# Patient Record
Sex: FEMALE | Race: WHITE | NOT HISPANIC OR LATINO | Employment: FULL TIME | URBAN - METROPOLITAN AREA
[De-identification: names, ages, dates, MRNs, and addresses within clinical notes are randomized per-mention and may not be internally consistent; named-entity substitution may affect disease eponyms.]

---

## 2023-08-21 ENCOUNTER — OFFICE VISIT (OUTPATIENT)
Dept: OBSTETRICS AND GYNECOLOGY | Facility: CLINIC | Age: 28
End: 2023-08-21
Payer: COMMERCIAL

## 2023-08-21 VITALS
WEIGHT: 144.4 LBS | DIASTOLIC BLOOD PRESSURE: 74 MMHG | SYSTOLIC BLOOD PRESSURE: 115 MMHG | BODY MASS INDEX: 23.21 KG/M2 | HEIGHT: 66 IN

## 2023-08-21 DIAGNOSIS — Z01.419 ENCOUNTER FOR GYNECOLOGICAL EXAMINATION WITHOUT ABNORMAL FINDING: Primary | ICD-10-CM

## 2023-08-21 PROCEDURE — 99385 PREV VISIT NEW AGE 18-39: CPT | Performed by: OBSTETRICS & GYNECOLOGY

## 2023-08-21 RX ORDER — DEXTROAMPHETAMINE SACCHARATE, AMPHETAMINE ASPARTATE, DEXTROAMPHETAMINE SULFATE AND AMPHETAMINE SULFATE 5; 5; 5; 5 MG/1; MG/1; MG/1; MG/1
TABLET ORAL
COMMUNITY

## 2023-08-21 RX ORDER — AMPICILLIN TRIHYDRATE 250 MG
CAPSULE ORAL
COMMUNITY

## 2023-08-21 RX ORDER — ESCITALOPRAM OXALATE 10 MG/1
TABLET, FILM COATED ORAL
COMMUNITY

## 2023-08-21 NOTE — PROGRESS NOTES
GYN Annual Exam     CC- Here for annual exam.     Camille Alford is a 28 y.o. female who presents for annual well woman exam. Periods are regular every 28-30 days, lasting a few days. Dysmenorrhea:none. Cyclic symptoms include none. No intermenstrual bleeding, spotting, or discharge.    Patient seen today as a new patient.  We discussed her obstetric history that was notable for a term vaginal delivery with a third-degree laceration.  She does have some resultant pelvic floor relaxation symptoms and some occasional stress urinary incontinence with certain activities.  She has done some pelvic floor strengthening maneuvers on her own and these have helped.  She is a runner and works out regularly.  She does note that she is unable to do any significant weight training as she will feel too much pelvic floor pressure to continue.  She does not have any rectal incontinence problems.  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    OB History    No obstetric history on file.         Current contraception: none  History of abnormal Pap smear: no  Family history of uterine, colon or ovarian cancer: no  History of abnormal mammogram: no  Family history of breast cancer: no  Last Pap : 2022    History reviewed. No pertinent past medical history.    Past Surgical History:   Procedure Laterality Date    WISDOM TOOTH EXTRACTION           Current Outpatient Medications:     amphetamine-dextroamphetamine (ADDERALL) 20 MG tablet, , Disp: , Rfl:     Lexapro 10 MG tablet, , Disp: , Rfl:     Prenatal MV-Min-Fe Fum-FA-DHA (PRENATAL 1 PO), , Disp: , Rfl:     Red Yeast Rice 600 MG capsule, , Disp: , Rfl:     No Known Allergies    Social History     Tobacco Use    Smoking status: Never    Smokeless tobacco: Never   Vaping Use    Vaping Use: Never used   Substance Use Topics    Alcohol use: Yes     Comment: occ    Drug use: Never       History reviewed. No pertinent family history.    Review of Systems   Constitutional:  Negative  "for fatigue and fever.   Genitourinary:  Positive for pelvic pressure and urinary incontinence. Negative for menstrual problem.     /74   Ht 167.6 cm (66\")   Wt 65.5 kg (144 lb 6.4 oz)   LMP 2023 (Exact Date)   BMI 23.31 kg/mý     Physical Exam  Constitutional:       Appearance: She is normal weight.   Genitourinary:      Bladder and urethral meatus normal.      No lesions in the vagina.      Right Labia: No lesions.     Left Labia: No lesions.     No vaginal discharge, tenderness or bleeding.      Posterior vaginal prolapse present.     No vaginal atrophy present.       Right Adnexa: not tender, not full and no mass present.     Left Adnexa: not tender, not full and no mass present.     No cervical motion tenderness, discharge, friability or lesion.      Uterus is not enlarged, fixed or tender.      No uterine mass detected.     Uterus is midaxial.   Neck:      Thyroid: No thyroid mass or thyromegaly.   Abdominal:      General: Abdomen is flat.      Palpations: Abdomen is soft. There is no mass.      Tenderness: There is no abdominal tenderness.   Neurological:      Mental Status: She is alert.   Vitals reviewed.             Assessment     1) GYN annual well woman exam.   2) mild pelvic organ prolapse with rectocele noted on exam.  Lengthy discussion with the patient regarding the findings on the pelvic exam and correlating those with the symptoms that she feels on a regular basis and with Valsalva.  I think she is doing the right thing by continuing the pelvic floor exercises.  We discussed the ramifications of another pregnancy and vaginal delivery on top of what she is already dealt with and certainly could consider elective  section should she desire not to pursue another vaginal delivery and potentially worsen the pelvic floor symptoms that she does have.     Plan     1) Breast Health - Clinical breast exam yearly, Discussed American cancer society recommendations for breast cancer " screening, and Self breast awareness monthly  2) Pap -collected today  3) Smoking status-negative  4) Encouraged to be wary of information obtained via social media and internet based on source and search.  5) Follow up prn and one year.       Song Norwood MD   8/21/2023  12:57 EDT

## 2023-08-23 ENCOUNTER — PATIENT MESSAGE (OUTPATIENT)
Dept: OBSTETRICS AND GYNECOLOGY | Facility: CLINIC | Age: 28
End: 2023-08-23
Payer: COMMERCIAL

## 2023-08-23 ENCOUNTER — PATIENT ROUNDING (BHMG ONLY) (OUTPATIENT)
Dept: OBSTETRICS AND GYNECOLOGY | Facility: CLINIC | Age: 28
End: 2023-08-23
Payer: COMMERCIAL

## 2023-08-23 NOTE — PROGRESS NOTES
My chart message has been sent to the patient for PATIENT ROUNDING with [facility].

## 2023-08-24 LAB
CYTOLOGIST CVX/VAG CYTO: NORMAL
CYTOLOGY CVX/VAG DOC CYTO: NORMAL
CYTOLOGY CVX/VAG DOC THIN PREP: NORMAL
DX ICD CODE: NORMAL
HIV 1 & 2 AB SER-IMP: NORMAL
HPV GENOTYPE REFLEX: NORMAL
HPV I/H RISK 4 DNA CVX QL PROBE+SIG AMP: NEGATIVE
OTHER STN SPEC: NORMAL
STAT OF ADQ CVX/VAG CYTO-IMP: NORMAL

## 2023-11-27 ENCOUNTER — HOSPITAL ENCOUNTER (EMERGENCY)
Facility: HOSPITAL | Age: 28
Discharge: LEFT BEFORE DISCHARGE | End: 2023-11-27
Attending: EMERGENCY MEDICINE
Payer: COMMERCIAL

## 2023-11-27 VITALS
DIASTOLIC BLOOD PRESSURE: 83 MMHG | OXYGEN SATURATION: 100 % | SYSTOLIC BLOOD PRESSURE: 140 MMHG | BODY MASS INDEX: 23.18 KG/M2 | HEIGHT: 67 IN | TEMPERATURE: 97.6 F | WEIGHT: 147.71 LBS | HEART RATE: 106 BPM | RESPIRATION RATE: 18 BRPM

## 2023-11-27 PROCEDURE — 99211 OFF/OP EST MAY X REQ PHY/QHP: CPT | Performed by: EMERGENCY MEDICINE

## 2023-11-27 NOTE — ED NOTES
Pt states that after seeing her bp is wnl and not as high as her wrist bp automatic reader was at home she wants to leave and to follow up with primary care, pt left ambulatory in no obvious s/s of acute distress

## 2024-04-23 ENCOUNTER — INITIAL PRENATAL (OUTPATIENT)
Dept: OBSTETRICS AND GYNECOLOGY | Facility: CLINIC | Age: 29
End: 2024-04-23
Payer: COMMERCIAL

## 2024-04-23 VITALS — DIASTOLIC BLOOD PRESSURE: 78 MMHG | WEIGHT: 140.2 LBS | BODY MASS INDEX: 21.96 KG/M2 | SYSTOLIC BLOOD PRESSURE: 114 MMHG

## 2024-04-23 DIAGNOSIS — Z34.91 INITIAL OBSTETRIC VISIT IN FIRST TRIMESTER: ICD-10-CM

## 2024-04-23 DIAGNOSIS — Z32.01 POSITIVE PREGNANCY TEST: Primary | ICD-10-CM

## 2024-04-23 LAB
B-HCG UR QL: POSITIVE
EXPIRATION DATE: ABNORMAL
INTERNAL NEGATIVE CONTROL: NEGATIVE
INTERNAL POSITIVE CONTROL: POSITIVE
Lab: ABNORMAL

## 2024-04-23 PROCEDURE — 0501F PRENATAL FLOW SHEET: CPT | Performed by: OBSTETRICS & GYNECOLOGY

## 2024-04-23 PROCEDURE — 81025 URINE PREGNANCY TEST: CPT | Performed by: OBSTETRICS & GYNECOLOGY

## 2024-04-23 NOTE — PROGRESS NOTES
Initial ob visit     CC- Here to initiate prenatal care.    Camille Alford is being seen today for her first obstetrical visit.  She is a 29 y.o.    5w6d gestation.   Patient is taking a prenatal vitamin and has some early nausea.  She is very sure about her menses.  We discussed the issues related to her rectocele and to expect some pelvic descent type symptoms during this pregnancy.  She very much wishes to have a vaginal delivery with this pregnancy.    # 1 - Date: None, Sex: Female, Weight: 7 g (0.3 oz), GA: None, Type: Vaginal, Spontaneous, Apgar1: None, Apgar5: None, Living: Living, Birth Comments: None    # 2 - Date: None, Sex: None, Weight: None, GA: None, Type: Spontaneous , Apgar1: None, Apgar5: None, Living: None, Birth Comments: None    # 3 - Date: None, Sex: None, Weight: None, GA: None, Type: None, Apgar1: None, Apgar5: None, Living: None, Birth Comments: None      Current obstetric complaints : Nausea      Prior obstetric issues, potential pregnancy concerns: None  Family history of genetic issues (includes FOB): Current child has a chromosome 16 abnormality.  Both parents have been screened and are negative  Prior infections concerning in pregnancy (Rash, fever in last 2 weeks): Negative  Varicella Hx -vaccine  Prior testing for Cystic Fibrosis Carrier or Sickle Cell Trait-pending  Prepregnancy BMI - Body mass index is 21.96 kg/m².  Hx of HSV for patient or partner : Negative    History reviewed. No pertinent past medical history.    Past Surgical History:   Procedure Laterality Date    WISDOM TOOTH EXTRACTION           Current Outpatient Medications:     Prenatal MV-Min-Fe Fum-FA-DHA (PRENATAL 1 PO), , Disp: , Rfl:     No Known Allergies    Social History     Socioeconomic History    Marital status:    Tobacco Use    Smoking status: Never    Smokeless tobacco: Never   Vaping Use    Vaping status: Never Used   Substance and Sexual Activity    Alcohol use: Yes     Comment: occ     Drug use: Never    Sexual activity: Yes     Partners: Male     Birth control/protection: None       History reviewed. No pertinent family history.    Review of systems     Constitutional : Nausea, fatigue present   : Vaginal bleeding, cramping negative  Breast Tenderness : Mild  All other systems reviewed and are negative       Objective    /78   Wt 63.6 kg (140 lb 3.2 oz)   LMP 03/13/2024 (Approximate)   BMI 21.96 kg/m²       General Appearance:    Alert, cooperative, in no acute distress, habitus normal   Head:    Normocephalic, without obvious abnormality, atraumatic   Eyes:            Lids and lashes normal, conjunctivae and sclerae normal, no   icterus, no pallor, corneas clear   Ears:    Ears appear intact with no abnormalities noted       Neck:   no thyromegaly, no mass.   Back:                         Lungs:     Clear to auscultation,respirations regular, even and     unlabored    Heart:    Regular rhythm and normal rate, normal S1 and S2, no            murmur, no gallop, no rub, no click   Breast Exam:    No masses, No nipple discharge   Abdomen:     Normal bowel sounds, no masses, no organomegaly, soft        non-tender, non-distended, no guarding, no rebound                 tenderness   Genitalia:        Extremities:   Moves all extremities well, no edema, no cyanosis, no              redness   Pulses:   Pulses palpable and equal bilaterally   Skin:   No bleeding, bruising or rash   Lymph nodes:   No palpable adenopathy   Neurologic:   Sensation intact, A&O times 3      Assessment & Plan     Diagnoses and all orders for this visit:    1. Positive pregnancy test (Primary)  -     POC Pregnancy, Urine    2. Initial obstetric visit in first trimester        1) Pregnancy at 5w6d  2) will see her back in 2 weeks with dating ultrasound.  We will do all of her blood work with her NIPT at 10+ weeks.         Activity recommendation : 150 minutes/week of moderate intensity aerobic activity unless we limit  for bleeding, hypertension or other pregnancy complication   Patient is on Prenatal vitamins  Problem list reviewed and updated.  Reviewed routine prenatal care with the office to include but not limited to   Zika (travel restrictions/ok to use insect repellant), not to changing cat litter, food restrictions, avoidance of alcohol, tobacco and drugs and saunas/hot tubs.   All questions answered.     Song Norwood MD   4/23/2024  13:14 EDT

## 2024-04-24 LAB
C TRACH RRNA SPEC QL NAA+PROBE: NEGATIVE
N GONORRHOEA RRNA SPEC QL NAA+PROBE: NEGATIVE

## 2024-04-25 LAB
AMPHETAMINES UR QL SCN: NEGATIVE NG/ML
BACTERIA UR CULT: NO GROWTH
BACTERIA UR CULT: NORMAL
BARBITURATES UR QL SCN: NEGATIVE NG/ML
BENZODIAZ UR QL: NEGATIVE NG/ML
BZE UR QL: NEGATIVE NG/ML
CANNABINOIDS UR QL SCN: NEGATIVE NG/ML
METHADONE UR QL SCN: NEGATIVE NG/ML
OPIATES UR QL: NEGATIVE NG/ML
PCP UR QL SCN: NEGATIVE NG/ML
PROPOXYPH UR QL SCN: NEGATIVE NG/ML

## 2024-05-09 ENCOUNTER — ROUTINE PRENATAL (OUTPATIENT)
Dept: OBSTETRICS AND GYNECOLOGY | Facility: CLINIC | Age: 29
End: 2024-05-09
Payer: COMMERCIAL

## 2024-05-09 VITALS — DIASTOLIC BLOOD PRESSURE: 74 MMHG | SYSTOLIC BLOOD PRESSURE: 112 MMHG | BODY MASS INDEX: 21.65 KG/M2 | WEIGHT: 138.2 LBS

## 2024-05-09 DIAGNOSIS — Z34.81 PRENATAL CARE, SUBSEQUENT PREGNANCY IN FIRST TRIMESTER: ICD-10-CM

## 2024-05-09 DIAGNOSIS — Z3A.01 7 WEEKS GESTATION OF PREGNANCY: Primary | ICD-10-CM

## 2024-05-09 LAB
GLUCOSE UR STRIP-MCNC: NEGATIVE MG/DL
PROT UR STRIP-MCNC: NEGATIVE MG/DL

## 2024-05-20 ENCOUNTER — TELEPHONE (OUTPATIENT)
Dept: OBSTETRICS AND GYNECOLOGY | Facility: CLINIC | Age: 29
End: 2024-05-20
Payer: COMMERCIAL

## 2024-05-20 DIAGNOSIS — O41.8X10 SUBCHORIONIC HEMATOMA IN FIRST TRIMESTER, SINGLE OR UNSPECIFIED FETUS: Primary | ICD-10-CM

## 2024-05-20 DIAGNOSIS — O46.8X1 SUBCHORIONIC HEMATOMA IN FIRST TRIMESTER, SINGLE OR UNSPECIFIED FETUS: Primary | ICD-10-CM

## 2024-05-20 NOTE — TELEPHONE ENCOUNTER
Spoke with Phoebe and let her know that Dr Norwood is fine with you coming in this week before your camping trip. Scheduled US for Thursday at 9:40 am.

## 2024-05-29 ENCOUNTER — ROUTINE PRENATAL (OUTPATIENT)
Dept: OBSTETRICS AND GYNECOLOGY | Facility: CLINIC | Age: 29
End: 2024-05-29
Payer: COMMERCIAL

## 2024-05-29 VITALS — BODY MASS INDEX: 21.61 KG/M2 | WEIGHT: 138 LBS | SYSTOLIC BLOOD PRESSURE: 100 MMHG | DIASTOLIC BLOOD PRESSURE: 60 MMHG

## 2024-05-29 DIAGNOSIS — Z34.81 PRENATAL CARE, SUBSEQUENT PREGNANCY IN FIRST TRIMESTER: Primary | ICD-10-CM

## 2024-05-29 DIAGNOSIS — Z3A.10 10 WEEKS GESTATION OF PREGNANCY: ICD-10-CM

## 2024-05-29 LAB
GLUCOSE UR STRIP-MCNC: NEGATIVE MG/DL
PROT UR STRIP-MCNC: NEGATIVE MG/DL

## 2024-05-29 NOTE — PROGRESS NOTES
OB follow up     Camille Alford is a 29 y.o.  10w2d being seen today for her obstetrical visit.  Patient reports no complaints. Fetal movement:  Too early .  Follow-up ultrasound today shows single viable intrauterine pregnancy at 10 weeks 2 days with no evidence of subchorionic bleed site.    Her prenatal care is complicated by (and status): Nothing    Review of Systems  Cramping/contractions : Negative  Vaginal bleeding: Negative  Fetal movement too early    /60   Wt 62.6 kg (138 lb)   LMP 2024 (Approximate)   BMI 21.61 kg/m²     FHT: 140 BPM   Uterine Size: size equals dates       Assessment    Diagnoses and all orders for this visit:    1. Prenatal care, subsequent pregnancy in first trimester (Primary)  -     POC Urinalysis Dipstick    2. 10 weeks gestation of pregnancy  -     WupgpetE21 PLUS Core (chr21,18,13,sex) - Blood,  -     OB Panel With HIV  -     Inheritest Core Panel (CF97,SMA,FraX) - Blood,; Future        1) pregnancy at 10w2d         Plan    Reviewed this stage of pregnancy  Problem list updated   Follow up in 4 weeks.  Prenatal labs and NIPT done today.    Song Norwood MD   2024  10:54 EDT

## 2024-06-02 ENCOUNTER — HOSPITAL ENCOUNTER (EMERGENCY)
Facility: HOSPITAL | Age: 29
Discharge: HOME | End: 2024-06-02
Attending: EMERGENCY MEDICINE | Admitting: EMERGENCY MEDICINE
Payer: COMMERCIAL

## 2024-06-02 VITALS
WEIGHT: 138 LBS | BODY MASS INDEX: 21.66 KG/M2 | SYSTOLIC BLOOD PRESSURE: 112 MMHG | TEMPERATURE: 100.4 F | HEIGHT: 67 IN | OXYGEN SATURATION: 99 % | DIASTOLIC BLOOD PRESSURE: 66 MMHG | HEART RATE: 118 BPM | RESPIRATION RATE: 18 BRPM

## 2024-06-02 DIAGNOSIS — Z34.91 FIRST TRIMESTER PREGNANCY: ICD-10-CM

## 2024-06-02 DIAGNOSIS — R50.9 FEVER AND CHILLS: ICD-10-CM

## 2024-06-02 DIAGNOSIS — B34.8 RHINOVIRUS INFECTION: Primary | ICD-10-CM

## 2024-06-02 LAB
ALBUMIN SERPL-MCNC: 4.2 G/DL (ref 3.5–5.2)
ALBUMIN/GLOB SERPL: 1.5 G/DL
ALP SERPL-CCNC: 65 U/L (ref 39–117)
ALT SERPL W P-5'-P-CCNC: 11 U/L (ref 1–33)
ANION GAP SERPL CALCULATED.3IONS-SCNC: 12.8 MMOL/L (ref 5–15)
AST SERPL-CCNC: 12 U/L (ref 1–32)
B PARAPERT DNA SPEC QL NAA+PROBE: NOT DETECTED
B PERT DNA SPEC QL NAA+PROBE: NOT DETECTED
BASOPHILS # BLD AUTO: 0.02 10*3/MM3 (ref 0–0.2)
BASOPHILS NFR BLD AUTO: 0.2 % (ref 0–1.5)
BILIRUB SERPL-MCNC: <0.2 MG/DL (ref 0–1.2)
BILIRUB UR QL STRIP: NEGATIVE
BUN SERPL-MCNC: 4 MG/DL (ref 6–20)
BUN/CREAT SERPL: 6.5 (ref 7–25)
C PNEUM DNA NPH QL NAA+NON-PROBE: NOT DETECTED
CALCIUM SPEC-SCNC: 9.3 MG/DL (ref 8.6–10.5)
CHLORIDE SERPL-SCNC: 100 MMOL/L (ref 98–107)
CLARITY UR: CLEAR
CO2 SERPL-SCNC: 22.2 MMOL/L (ref 22–29)
COLOR UR: YELLOW
CREAT SERPL-MCNC: 0.62 MG/DL (ref 0.57–1)
DEPRECATED RDW RBC AUTO: 39.3 FL (ref 37–54)
EGFRCR SERPLBLD CKD-EPI 2021: 123 ML/MIN/1.73
EOSINOPHIL # BLD AUTO: 0 10*3/MM3 (ref 0–0.4)
EOSINOPHIL NFR BLD AUTO: 0 % (ref 0.3–6.2)
ERYTHROCYTE [DISTWIDTH] IN BLOOD BY AUTOMATED COUNT: 12.2 % (ref 12.3–15.4)
FLUAV SUBTYP SPEC NAA+PROBE: NOT DETECTED
FLUBV RNA ISLT QL NAA+PROBE: NOT DETECTED
GLOBULIN UR ELPH-MCNC: 2.8 GM/DL
GLUCOSE SERPL-MCNC: 107 MG/DL (ref 65–99)
GLUCOSE UR STRIP-MCNC: NEGATIVE MG/DL
HADV DNA SPEC NAA+PROBE: NOT DETECTED
HCOV 229E RNA SPEC QL NAA+PROBE: NOT DETECTED
HCOV HKU1 RNA SPEC QL NAA+PROBE: NOT DETECTED
HCOV NL63 RNA SPEC QL NAA+PROBE: NOT DETECTED
HCOV OC43 RNA SPEC QL NAA+PROBE: NOT DETECTED
HCT VFR BLD AUTO: 38.5 % (ref 34–46.6)
HETEROPH AB SER QL LA: NEGATIVE
HGB BLD-MCNC: 13 G/DL (ref 12–15.9)
HGB UR QL STRIP.AUTO: NEGATIVE
HMPV RNA NPH QL NAA+NON-PROBE: NOT DETECTED
HPIV1 RNA ISLT QL NAA+PROBE: NOT DETECTED
HPIV2 RNA SPEC QL NAA+PROBE: NOT DETECTED
HPIV3 RNA NPH QL NAA+PROBE: NOT DETECTED
HPIV4 P GENE NPH QL NAA+PROBE: NOT DETECTED
IMM GRANULOCYTES # BLD AUTO: 0.04 10*3/MM3 (ref 0–0.05)
IMM GRANULOCYTES NFR BLD AUTO: 0.3 % (ref 0–0.5)
KETONES UR QL STRIP: NEGATIVE
LEUKOCYTE ESTERASE UR QL STRIP.AUTO: NEGATIVE
LYMPHOCYTES # BLD AUTO: 0.81 10*3/MM3 (ref 0.7–3.1)
LYMPHOCYTES NFR BLD AUTO: 7 % (ref 19.6–45.3)
M PNEUMO IGG SER IA-ACNC: NOT DETECTED
MCH RBC QN AUTO: 29.5 PG (ref 26.6–33)
MCHC RBC AUTO-ENTMCNC: 33.8 G/DL (ref 31.5–35.7)
MCV RBC AUTO: 87.5 FL (ref 79–97)
MONOCYTES # BLD AUTO: 0.53 10*3/MM3 (ref 0.1–0.9)
MONOCYTES NFR BLD AUTO: 4.6 % (ref 5–12)
NEUTROPHILS NFR BLD AUTO: 10.14 10*3/MM3 (ref 1.7–7)
NEUTROPHILS NFR BLD AUTO: 87.9 % (ref 42.7–76)
NITRITE UR QL STRIP: NEGATIVE
NRBC BLD AUTO-RTO: 0 /100 WBC (ref 0–0.2)
PH UR STRIP.AUTO: 7 [PH] (ref 5–8)
PLATELET # BLD AUTO: 265 10*3/MM3 (ref 140–450)
PMV BLD AUTO: 10 FL (ref 6–12)
POTASSIUM SERPL-SCNC: 4 MMOL/L (ref 3.5–5.2)
PROT SERPL-MCNC: 7 G/DL (ref 6–8.5)
PROT UR QL STRIP: NEGATIVE
RBC # BLD AUTO: 4.4 10*6/MM3 (ref 3.77–5.28)
RHINOVIRUS RNA SPEC NAA+PROBE: DETECTED
RSV RNA NPH QL NAA+NON-PROBE: NOT DETECTED
S PYO AG THROAT QL: NEGATIVE
SARS-COV-2 RNA NPH QL NAA+NON-PROBE: NOT DETECTED
SODIUM SERPL-SCNC: 135 MMOL/L (ref 136–145)
SP GR UR STRIP: <=1.005 (ref 1–1.03)
UROBILINOGEN UR QL STRIP: NORMAL
WBC NRBC COR # BLD AUTO: 11.54 10*3/MM3 (ref 3.4–10.8)

## 2024-06-02 PROCEDURE — 0202U NFCT DS 22 TRGT SARS-COV-2: CPT | Performed by: EMERGENCY MEDICINE

## 2024-06-02 PROCEDURE — 87081 CULTURE SCREEN ONLY: CPT | Performed by: EMERGENCY MEDICINE

## 2024-06-02 PROCEDURE — 86308 HETEROPHILE ANTIBODY SCREEN: CPT | Performed by: EMERGENCY MEDICINE

## 2024-06-02 PROCEDURE — 99283 EMERGENCY DEPT VISIT LOW MDM: CPT

## 2024-06-02 PROCEDURE — 81003 URINALYSIS AUTO W/O SCOPE: CPT | Performed by: EMERGENCY MEDICINE

## 2024-06-02 PROCEDURE — 87880 STREP A ASSAY W/OPTIC: CPT | Performed by: EMERGENCY MEDICINE

## 2024-06-02 PROCEDURE — 85025 COMPLETE CBC W/AUTO DIFF WBC: CPT | Performed by: EMERGENCY MEDICINE

## 2024-06-02 PROCEDURE — 25810000003 SODIUM CHLORIDE 0.9 % SOLUTION: Performed by: EMERGENCY MEDICINE

## 2024-06-02 PROCEDURE — 80053 COMPREHEN METABOLIC PANEL: CPT | Performed by: EMERGENCY MEDICINE

## 2024-06-02 RX ORDER — ACETAMINOPHEN 500 MG
1000 TABLET ORAL ONCE
Status: COMPLETED | OUTPATIENT
Start: 2024-06-02 | End: 2024-06-02

## 2024-06-02 RX ORDER — MAGNESIUM CARBONATE
POWDER (GRAM) MISCELLANEOUS
COMMUNITY

## 2024-06-02 RX ORDER — SODIUM CHLORIDE 0.9 % (FLUSH) 0.9 %
10 SYRINGE (ML) INJECTION AS NEEDED
Status: DISCONTINUED | OUTPATIENT
Start: 2024-06-02 | End: 2024-06-02 | Stop reason: HOSPADM

## 2024-06-02 RX ADMIN — ACETAMINOPHEN 1000 MG: 500 TABLET ORAL at 13:30

## 2024-06-02 RX ADMIN — SODIUM CHLORIDE 1000 ML: 9 INJECTION, SOLUTION INTRAVENOUS at 11:41

## 2024-06-02 NOTE — DISCHARGE INSTRUCTIONS
Rest at home avoiding any particularly strenuous activity today and tomorrow.     Eat small, frequent meals and drink plenty of fluids. You can safely take tylenol 1000mg every 6 hours as needed for fever, headache, bodyaches, and chills.    Monitor for any signs of recurrent symptoms or worsening and see your primary doctor to discuss your ER visit while returning to the ER if any concerns as we discussed.

## 2024-06-02 NOTE — Clinical Note
[facility] EMERGENCY DEPARTMENT  [address]  [facility] [address]  Phone: [phone]    Camille Alford was seen and treated in our emergency department on 6/2/2024.  She may return to work on 06/04/2024.         Thank you for choosing [facility].    Lou Cintron MD

## 2024-06-02 NOTE — ED PROVIDER NOTES
EMERGENCY DEPARTMENT ENCOUNTER    Room Number:  20/20  PCP: Angelia Jordan APRN  Independent Historians: Patient    HPI:  Chief Complaint: had concerns including Headache and Fever.      A complete HPI/ROS/PMH/PSH/SH/FH are unobtainable due to: None    Chronic or social conditions impacting patient care (Social Determinants of Health): None      Context: Camille Alford is a 29 y.o. female with no chronic medical problems but is actually 11 weeks pregnant currently with her third pregnancy who presents to the ED c/o acute febrile illness since Thursday.  Patient with Tmax at home of 102.  Her last dose of Tylenol was 8 AM this morning.  She started with sore throat and fever on Thursday, and she went to urgent care and had some swabs done that were all negative.  Patient says she now feels worse including diffuse bodyaches that does include her neck.  She also has headache.  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  She personally has not had pneumonia in the past and no recent antibiotics.  Patient reports no current issues with this pregnancy but did have early on some vaginal bleeding and spotting with a subchorionic hemorrhage that has since resolved.        Review of prior external notes (non-ED) -and- Review of prior external test results outside of this encounter: Patient sees Dr. Norwood for this pregnancy and was seen on May 29 in office.  I reviewed office notes.  Patient had prenatal labs drawn at that time.  Patient had strep and COVID swabs done at urgent care on May 31 that were negative    Prescription drug monitoring program review:     N/A    PAST MEDICAL HISTORY  Active Ambulatory Problems     Diagnosis Date Noted    No Active Ambulatory Problems     Resolved Ambulatory Problems     Diagnosis Date Noted    No Resolved Ambulatory Problems     No Additional Past Medical History         PAST SURGICAL HISTORY  Past Surgical History:    Procedure Laterality Date    WISDOM TOOTH EXTRACTION           FAMILY HISTORY  History reviewed. No pertinent family history.      SOCIAL HISTORY  Social History     Socioeconomic History    Marital status:    Tobacco Use    Smoking status: Never     Passive exposure: Never    Smokeless tobacco: Never   Vaping Use    Vaping status: Never Used   Substance and Sexual Activity    Alcohol use: Not Currently     Comment: occ    Drug use: Never    Sexual activity: Yes     Partners: Male     Birth control/protection: None         ALLERGIES  Patient has no known allergies.        REVIEW OF SYSTEMS  Review of Systems  Included in HPI  All systems reviewed and negative except for those discussed in HPI.      PHYSICAL EXAM    I have reviewed the triage vital signs and nursing notes.    ED Triage Vitals [06/02/24 1103]   Temp Heart Rate Resp BP SpO2   (!) 100.9 °F (38.3 °C) (!) 127 18 -- 99 %      Temp src Heart Rate Source Patient Position BP Location FiO2 (%)   Tympanic Monitor -- -- --       Physical Exam  GENERAL: Pleasant cooperative conversant well-appearing female, nontoxic appearance, alert, no acute distress  SKIN: Warm, dry  HENT: Normocephalic, atraumatic, posterior pharyngeal erythema with no exudates and no asymmetric swelling, no trismus, no uvula pointing  EYES: no scleral icterus, EOMI, no conjunctivitis  CV: regular rhythm, regular rate  RESPIRATORY: normal effort, lungs clear, no wheezing, no rhonchi  ABDOMEN: soft, nontender, nondistended  MUSCULOSKELETAL: no deformity, no lower extremity edema  NEURO: alert, moves all extremities, follows commands                                                                   LAB RESULTS  Recent Results (from the past 24 hour(s))   Comprehensive Metabolic Panel    Collection Time: 06/02/24 11:39 AM    Specimen: Arm, Right; Blood   Result Value Ref Range    Glucose 107 (H) 65 - 99 mg/dL    BUN 4 (L) 6 - 20 mg/dL    Creatinine 0.62 0.57 - 1.00 mg/dL    Sodium 135  (L) 136 - 145 mmol/L    Potassium 4.0 3.5 - 5.2 mmol/L    Chloride 100 98 - 107 mmol/L    CO2 22.2 22.0 - 29.0 mmol/L    Calcium 9.3 8.6 - 10.5 mg/dL    Total Protein 7.0 6.0 - 8.5 g/dL    Albumin 4.2 3.5 - 5.2 g/dL    ALT (SGPT) 11 1 - 33 U/L    AST (SGOT) 12 1 - 32 U/L    Alkaline Phosphatase 65 39 - 117 U/L    Total Bilirubin <0.2 0.0 - 1.2 mg/dL    Globulin 2.8 gm/dL    A/G Ratio 1.5 g/dL    BUN/Creatinine Ratio 6.5 (L) 7.0 - 25.0    Anion Gap 12.8 5.0 - 15.0 mmol/L    eGFR 123.0 >60.0 mL/min/1.73   Mononucleosis Screen    Collection Time: 06/02/24 11:39 AM    Specimen: Arm, Right; Blood   Result Value Ref Range    Monospot Negative Negative   CBC Auto Differential    Collection Time: 06/02/24 11:39 AM    Specimen: Arm, Right; Blood   Result Value Ref Range    WBC 11.54 (H) 3.40 - 10.80 10*3/mm3    RBC 4.40 3.77 - 5.28 10*6/mm3    Hemoglobin 13.0 12.0 - 15.9 g/dL    Hematocrit 38.5 34.0 - 46.6 %    MCV 87.5 79.0 - 97.0 fL    MCH 29.5 26.6 - 33.0 pg    MCHC 33.8 31.5 - 35.7 g/dL    RDW 12.2 (L) 12.3 - 15.4 %    RDW-SD 39.3 37.0 - 54.0 fl    MPV 10.0 6.0 - 12.0 fL    Platelets 265 140 - 450 10*3/mm3    Neutrophil % 87.9 (H) 42.7 - 76.0 %    Lymphocyte % 7.0 (L) 19.6 - 45.3 %    Monocyte % 4.6 (L) 5.0 - 12.0 %    Eosinophil % 0.0 (L) 0.3 - 6.2 %    Basophil % 0.2 0.0 - 1.5 %    Immature Grans % 0.3 0.0 - 0.5 %    Neutrophils, Absolute 10.14 (H) 1.70 - 7.00 10*3/mm3    Lymphocytes, Absolute 0.81 0.70 - 3.10 10*3/mm3    Monocytes, Absolute 0.53 0.10 - 0.90 10*3/mm3    Eosinophils, Absolute 0.00 0.00 - 0.40 10*3/mm3    Basophils, Absolute 0.02 0.00 - 0.20 10*3/mm3    Immature Grans, Absolute 0.04 0.00 - 0.05 10*3/mm3    nRBC 0.0 0.0 - 0.2 /100 WBC   Respiratory Panel PCR w/COVID-19(SARS-CoV-2) ALLISON/LISA/ANAID/PAD/COR/ANDREW In-House, NP Swab in [facility]/VTM, 2 HR TAT - Swab, Nasopharynx    Collection Time: 06/02/24 11:44 AM    Specimen: Nasopharynx; Swab   Result Value Ref Range    ADENOVIRUS, PCR Not Detected Not Detected     Coronavirus 229E Not Detected Not Detected    Coronavirus HKU1 Not Detected Not Detected    Coronavirus NL63 Not Detected Not Detected    Coronavirus OC43 Not Detected Not Detected    COVID19 Not Detected Not Detected - Ref. Range    Human Metapneumovirus Not Detected Not Detected    Human Rhinovirus/Enterovirus Detected (A) Not Detected    Influenza A PCR Not Detected Not Detected    Influenza B PCR Not Detected Not Detected    Parainfluenza Virus 1 Not Detected Not Detected    Parainfluenza Virus 2 Not Detected Not Detected    Parainfluenza Virus 3 Not Detected Not Detected    Parainfluenza Virus 4 Not Detected Not Detected    RSV, PCR Not Detected Not Detected    Bordetella pertussis pcr Not Detected Not Detected    Bordetella parapertussis PCR Not Detected Not Detected    Chlamydophila pneumoniae PCR Not Detected Not Detected    Mycoplasma pneumo by PCR Not Detected Not Detected   Rapid Strep A Screen - Swab, Throat    Collection Time: 06/02/24 11:48 AM    Specimen: Throat; Swab   Result Value Ref Range    Strep A Ag Negative Negative   Urinalysis With Microscopic If Indicated (No Culture) - Urine, Clean Catch    Collection Time: 06/02/24 12:32 PM    Specimen: Urine, Clean Catch   Result Value Ref Range    Color, UA Yellow Yellow, Straw    Appearance, UA Clear Clear    pH, UA 7.0 5.0 - 8.0    Specific Gravity, UA <=1.005 1.005 - 1.030    Glucose, UA Negative Negative    Ketones, UA Negative Negative    Bilirubin, UA Negative Negative    Blood, UA Negative Negative    Protein, UA Negative Negative    Leuk Esterase, UA Negative Negative    Nitrite, UA Negative Negative    Urobilinogen, UA 0.2 E.U./dL 0.2 - 1.0 E.U./dL         RADIOLOGY  No Radiology Exams Resulted Within Past 24 Hours      MEDICATIONS GIVEN IN ER  Medications   sodium chloride 0.9 % flush 10 mL (has no administration in time range)   acetaminophen (TYLENOL) tablet 1,000 mg (has no administration in time range)   sodium chloride 0.9 % bolus 1,000  mL (1,000 mL Intravenous New Bag 6/2/24 1141)         ORDERS PLACED DURING THIS VISIT:  Orders Placed This Encounter   Procedures    Rapid Strep A Screen - Swab, Throat    Respiratory Panel PCR w/COVID-19(SARS-CoV-2) ALLISON/LISA/ANAID/PAD/COR/ANDREW In-House, NP Swab in UTM/VTM, 2 HR TAT - Swab, Nasopharynx    Beta Strep Culture, Throat - Swab, Throat    Comprehensive Metabolic Panel    Urinalysis With Microscopic If Indicated (No Culture) - Urine, Clean Catch    Mononucleosis Screen    CBC Auto Differential    Insert Peripheral IV    CBC & Differential         OUTPATIENT MEDICATION MANAGEMENT:  Current Facility-Administered Medications Ordered in Epic   Medication Dose Route Frequency Provider Last Rate Last Admin    acetaminophen (TYLENOL) tablet 1,000 mg  1,000 mg Oral Once Lou Cintron MD        sodium chloride 0.9 % flush 10 mL  10 mL Intravenous PRN Lou Cintron MD         Current Outpatient Medications Ordered in Epic   Medication Sig Dispense Refill    Prenatal MV-Min-Fe Fum-FA-DHA (PRENATAL 1 PO)       Magnesium Carbonate powder Use.           PROCEDURES  Procedures            PROGRESS, DATA ANALYSIS, CONSULTS, AND MEDICAL DECISION MAKING  All labs have been independently interpreted by me.  All radiology studies have been reviewed by me. All EKG's have been independently viewed and interpreted by me.  Discussion below represents my analysis of pertinent findings related to patient's condition, differential diagnosis, treatment plan and final disposition.    Differential diagnosis includes but is not limited to   Patient presenting with sore throat, body aches, headache, and fever.  Treatment options are limited for patient's fever due to her being 11 weeks pregnant.  Plan for sepsis screening with respiratory panel, Monospot, strep screen, UA, and basic labs.          ED Course as of 06/02/24 1323   Sun Jun 02, 2024   1323 I discussed all results with patient and family and answered all questions  to the best of my ability. The patient was encouraged to follow up appropriately.      I specifically mention mildly elevated white blood cell count and rhinovirus positive on viral swab.    Routine discharge counseling was given, and the patient was instructed to promptly call or followup with their primary physician or even return to the ER if any worsening, changing or persistent symptoms.         [AR]      ED Course User Index  [AR] Lou Cintron MD             AS OF 13:23 EDT VITALS:    BP - 112/66  HR - 105  TEMP - (!) 100.9 °F (38.3 °C) (Tympanic)  O2 SATS - 98%      COMPLEXITY OF CARE  Admission was considered but after careful review of the patient's presentation, physical examination, diagnostic results, and response to treatment the patient may be safely discharged with outpatient follow-up.    DIAGNOSIS  Final diagnoses:   Rhinovirus infection   Fever and chills   First trimester pregnancy         DISPOSITION  ED Disposition       ED Disposition   Discharge    Condition   Stable    Comment   --                Please note that portions of this document were completed with a voice recognition program.    Note Disclaimer: At [facility], we believe that sharing information builds trust and better relationships. You are receiving this note because you recently visited [facility]. It is possible you will see health information before a provider has talked with you about it. This kind of information can be easy to misunderstand. To help you fully understand what it means for your health, we urge you to discuss this note with your provider.         Lou Cintron MD  06/02/24 7026

## 2024-06-04 LAB — BACTERIA SPEC AEROBE CULT: NORMAL

## 2024-06-05 ENCOUNTER — TELEPHONE (OUTPATIENT)
Dept: OBSTETRICS AND GYNECOLOGY | Facility: CLINIC | Age: 29
End: 2024-06-05
Payer: COMMERCIAL

## 2024-06-26 ENCOUNTER — ROUTINE PRENATAL (OUTPATIENT)
Dept: OBSTETRICS AND GYNECOLOGY | Facility: CLINIC | Age: 29
End: 2024-06-26
Payer: COMMERCIAL

## 2024-06-26 VITALS — DIASTOLIC BLOOD PRESSURE: 74 MMHG | WEIGHT: 141.4 LBS | BODY MASS INDEX: 22.15 KG/M2 | SYSTOLIC BLOOD PRESSURE: 117 MMHG

## 2024-06-26 DIAGNOSIS — Z34.82 PRENATAL CARE, SUBSEQUENT PREGNANCY IN SECOND TRIMESTER: ICD-10-CM

## 2024-06-26 DIAGNOSIS — Z3A.14 14 WEEKS GESTATION OF PREGNANCY: Primary | ICD-10-CM

## 2024-06-26 LAB
GLUCOSE UR STRIP-MCNC: NEGATIVE MG/DL
PROT UR STRIP-MCNC: NEGATIVE MG/DL

## 2024-06-26 PROCEDURE — 0502F SUBSEQUENT PRENATAL CARE: CPT | Performed by: OBSTETRICS & GYNECOLOGY

## 2024-06-26 NOTE — PROGRESS NOTES
OB follow up     Camille Alford is a 29 y.o.  14w2d being seen today for her obstetrical visit.  Patient reports no complaints. Fetal movement: normal.    Her prenatal care is complicated by (and status): None    Review of Systems  Cramping/contractions : Negative  Vaginal bleeding: Negative  Fetal movement early    /74   Wt 64.1 kg (141 lb 6.4 oz)   LMP 2024 (Approximate)   BMI 22.15 kg/m²     FHT: 140 BPM   Uterine Size: size equals dates       Assessment    Diagnoses and all orders for this visit:    1. 14 weeks gestation of pregnancy (Primary)  -     POC Urinalysis Dipstick  -     OB Panel With HIV  -     Cytomegalovirus Antibody, IgG    2. Prenatal care, subsequent pregnancy in second trimester        1) pregnancy at 14w2d         Plan    Reviewed this stage of pregnancy  Problem list updated   Follow up in 6 weeks for anatomy ultrasound.  Will get prenatal panel today as it has not yet been drawn.  Patient also requests CMV IgG as she has some exposure to CMV at her workplace.    Song Norwood MD   2024  14:14 EDT

## 2024-06-27 LAB
ABO GROUP BLD: ABNORMAL
BASOPHILS # BLD AUTO: 0 X10E3/UL (ref 0–0.2)
BASOPHILS NFR BLD AUTO: 0 %
BLD GP AB SCN SERPL QL: NEGATIVE
CMV IGG SERPL IA-ACNC: <0.6 U/ML (ref 0–0.59)
EOSINOPHIL # BLD AUTO: 0.1 X10E3/UL (ref 0–0.4)
EOSINOPHIL NFR BLD AUTO: 1 %
ERYTHROCYTE [DISTWIDTH] IN BLOOD BY AUTOMATED COUNT: 12.2 % (ref 11.7–15.4)
HBV SURFACE AG SERPL QL IA: NEGATIVE
HCT VFR BLD AUTO: 36.1 % (ref 34–46.6)
HCV IGG SERPL QL IA: NON REACTIVE
HGB BLD-MCNC: 12.4 G/DL (ref 11.1–15.9)
HIV 1+2 AB+HIV1 P24 AG SERPL QL IA: NON REACTIVE
IMM GRANULOCYTES # BLD AUTO: 0.2 X10E3/UL (ref 0–0.1)
IMM GRANULOCYTES NFR BLD AUTO: 1 %
LYMPHOCYTES # BLD AUTO: 2.5 X10E3/UL (ref 0.7–3.1)
LYMPHOCYTES NFR BLD AUTO: 17 %
MCH RBC QN AUTO: 30 PG (ref 26.6–33)
MCHC RBC AUTO-ENTMCNC: 34.3 G/DL (ref 31.5–35.7)
MCV RBC AUTO: 87 FL (ref 79–97)
MONOCYTES # BLD AUTO: 0.7 X10E3/UL (ref 0.1–0.9)
MONOCYTES NFR BLD AUTO: 5 %
NEUTROPHILS # BLD AUTO: 10.8 X10E3/UL (ref 1.4–7)
NEUTROPHILS NFR BLD AUTO: 76 %
PLATELET # BLD AUTO: 286 X10E3/UL (ref 150–450)
RBC # BLD AUTO: 4.13 X10E6/UL (ref 3.77–5.28)
RH BLD: POSITIVE
RPR SER QL: NON REACTIVE
RUBV IGG SERPL IA-ACNC: 4.44 INDEX
WBC # BLD AUTO: 14.2 X10E3/UL (ref 3.4–10.8)

## 2024-07-30 ENCOUNTER — TELEPHONE (OUTPATIENT)
Dept: OBSTETRICS AND GYNECOLOGY | Facility: CLINIC | Age: 29
End: 2024-07-30

## 2024-07-30 ENCOUNTER — ROUTINE PRENATAL (OUTPATIENT)
Dept: OBSTETRICS AND GYNECOLOGY | Facility: CLINIC | Age: 29
End: 2024-07-30
Payer: COMMERCIAL

## 2024-07-30 VITALS
WEIGHT: 145.96 LBS | SYSTOLIC BLOOD PRESSURE: 108 MMHG | BODY MASS INDEX: 22.86 KG/M2 | DIASTOLIC BLOOD PRESSURE: 73 MMHG

## 2024-07-30 DIAGNOSIS — Z3A.19 19 WEEKS GESTATION OF PREGNANCY: Primary | ICD-10-CM

## 2024-07-30 DIAGNOSIS — R30.0 DYSURIA: ICD-10-CM

## 2024-07-30 DIAGNOSIS — N89.8 VAGINAL PRURITUS: ICD-10-CM

## 2024-07-30 LAB
BILIRUB BLD-MCNC: NEGATIVE MG/DL
GLUCOSE UR STRIP-MCNC: NEGATIVE MG/DL
KETONES UR QL: NEGATIVE
LEUKOCYTE EST, POC: ABNORMAL
PH UR: 6 [PH] (ref 5–8)
PROT UR STRIP-MCNC: NEGATIVE MG/DL
RBC # UR STRIP: NEGATIVE /UL
SP GR UR: 1.01 (ref 1–1.03)
UROBILINOGEN UR QL: ABNORMAL

## 2024-07-30 PROCEDURE — 0502F SUBSEQUENT PRENATAL CARE: CPT | Performed by: OBSTETRICS & GYNECOLOGY

## 2024-07-30 NOTE — PROGRESS NOTES
OB follow up     Camille Alford is a 29 y.o.  19w1d being seen today for her obstetrical visit.  Patient reports  vaginal and periurethral itching and burning . . Fetal movement: normal.    Her prenatal care is complicated by (and status): Nothing   Review of Systems  Cramping/contractions : Negative  Vaginal bleeding: Negative  Fetal movement is normal    /73   Wt 66.2 kg (145 lb 15.4 oz)   LMP 2024 (Approximate)   BMI 22.86 kg/m²     FHT: . BPM   Uterine Size: size equals dates   Sterile speculum: Normal external genitalia.  No lesions seen.  Normal thin white discharge with no obvious abnormal or pathologic discharge seen.  Urinalysis with trace leukocyte  Assessment    Diagnoses and all orders for this visit:    1. 19 weeks gestation of pregnancy (Primary)  -     POC Urinalysis Dipstick    2. Vaginal pruritus    3. Dysuria        1) pregnancy at 19w1d         Plan    Reviewed this stage of pregnancy  Problem list updated   Follow up in 1 week.  For her scheduled 20-week ultrasound.  Will send urine culture and vaginitis panel and base treatment on those results.    Song Norwood MD   2024  11:51 EDT

## 2024-07-30 NOTE — TELEPHONE ENCOUNTER
VIKRAM DEVINE     [phone]    PT IS 19wks    PT IS WANTING TO BE SEEN TODAY POSSIBLE UTI OR YEAST INFECTION    SYMPTOMS ITCHING & BURNING x3 DAYS

## 2024-08-01 LAB
A VAGINAE DNA VAG QL NAA+PROBE: NORMAL SCORE
BACTERIA UR CULT: NO GROWTH
BACTERIA UR CULT: NORMAL
BVAB2 DNA VAG QL NAA+PROBE: NORMAL SCORE
C ALBICANS DNA VAG QL NAA+PROBE: NEGATIVE
C GLABRATA DNA VAG QL NAA+PROBE: NEGATIVE
C TRACH DNA SPEC QL NAA+PROBE: NEGATIVE
MEGA1 DNA VAG QL NAA+PROBE: NORMAL SCORE
N GONORRHOEA DNA VAG QL NAA+PROBE: NEGATIVE
T VAGINALIS DNA VAG QL NAA+PROBE: NEGATIVE

## 2024-08-07 ENCOUNTER — ROUTINE PRENATAL (OUTPATIENT)
Dept: OBSTETRICS AND GYNECOLOGY | Facility: CLINIC | Age: 29
End: 2024-08-07
Payer: COMMERCIAL

## 2024-08-07 VITALS — BODY MASS INDEX: 22.87 KG/M2 | DIASTOLIC BLOOD PRESSURE: 71 MMHG | WEIGHT: 146 LBS | SYSTOLIC BLOOD PRESSURE: 116 MMHG

## 2024-08-07 DIAGNOSIS — Z3A.20 20 WEEKS GESTATION OF PREGNANCY: Primary | ICD-10-CM

## 2024-08-07 DIAGNOSIS — Z34.82 PRENATAL CARE, SUBSEQUENT PREGNANCY IN SECOND TRIMESTER: ICD-10-CM

## 2024-08-07 LAB
GLUCOSE UR STRIP-MCNC: NEGATIVE MG/DL
PROT UR STRIP-MCNC: ABNORMAL MG/DL

## 2024-08-07 NOTE — PROGRESS NOTES
OB follow up     Camille Alford is a 29 y.o.  20w2d being seen today for her obstetrical visit.  Patient reports no complaints. Fetal movement: normal.  Ultrasound today with normal growth and fetal anatomy with some suboptimal views to be repeated on next visit.    Her prenatal care is complicated by (and status): Nothing    Review of Systems  Cramping/contractions : Negative  Vaginal bleeding: Negative  Fetal movement is normal    /71   Wt 66.2 kg (146 lb)   LMP 2024 (Approximate)   BMI 22.87 kg/m²     FHT: 143 BPM   Uterine Size: size equals dates       Assessment    Diagnoses and all orders for this visit:    1. 20 weeks gestation of pregnancy (Primary)  -     POC Urinalysis Dipstick    2. Prenatal care, subsequent pregnancy in second trimester        1) pregnancy at 20w2d         Plan    Reviewed this stage of pregnancy  Problem list updated   Follow up in 4 weeks at which time we will repeat anatomy assessment.    Song Norwood MD   2024  09:42 EDT

## 2024-09-04 ENCOUNTER — ROUTINE PRENATAL (OUTPATIENT)
Dept: OBSTETRICS AND GYNECOLOGY | Facility: CLINIC | Age: 29
End: 2024-09-04
Payer: COMMERCIAL

## 2024-09-04 VITALS — WEIGHT: 152 LBS | DIASTOLIC BLOOD PRESSURE: 78 MMHG | SYSTOLIC BLOOD PRESSURE: 119 MMHG | BODY MASS INDEX: 23.81 KG/M2

## 2024-09-04 DIAGNOSIS — Z3A.24 24 WEEKS GESTATION OF PREGNANCY: Primary | ICD-10-CM

## 2024-09-04 DIAGNOSIS — Z34.82 PRENATAL CARE, SUBSEQUENT PREGNANCY IN SECOND TRIMESTER: ICD-10-CM

## 2024-09-04 LAB
GLUCOSE UR STRIP-MCNC: NEGATIVE MG/DL
PROT UR STRIP-MCNC: ABNORMAL MG/DL

## 2024-09-04 NOTE — PROGRESS NOTES
OB follow up     Camille Alford is a 29 y.o.  24w2d being seen today for her obstetrical visit.  Patient reports no complaints. Fetal movement: normal.  Anatomy ultrasound today shows normal anatomy.    Her prenatal care is complicated by (and status): Negative    Review of Systems  Cramping/contractions : Negative  Vaginal bleeding: Negative  Fetal movement is normal    /78   Wt 68.9 kg (152 lb)   LMP 2024 (Approximate)   BMI 23.81 kg/m²     FHT: 147 BPM   Uterine Size: size equals dates       Assessment    Diagnoses and all orders for this visit:    1. 24 weeks gestation of pregnancy (Primary)  -     POC Urinalysis Dipstick  -     Gestational Screen 1 Hr ([facility]); Future  -     Hemoglobin & Hematocrit, Blood; Future  -     T Pallidum Antibody w/ reflex RPR (Syphilis); Future    2. Prenatal care, subsequent pregnancy in second trimester        1) pregnancy at 24w2d         Plan    Reviewed this stage of pregnancy  Problem list updated   Follow up in 4 weeks.  Will do 1 hour glucose and treponemal antibody on next visit.    Song Norwood MD   2024  10:01 EDT

## 2024-09-16 ENCOUNTER — TELEPHONE (OUTPATIENT)
Dept: OBSTETRICS AND GYNECOLOGY | Facility: CLINIC | Age: 29
End: 2024-09-16

## 2024-09-16 NOTE — TELEPHONE ENCOUNTER
"Caller: Camille Alford \"LEONEL\"    Relationship to patient: Self    Best call back number: [phone]    Patient is needing: OB PT OF THE OFFICE. 26 WEEKS PREGNANT. 2 WEEKS AGO PT WAS TREATED BY PCP FOR BRONCHITIS. PT PRESCRIBED INHALER AND ANTIBIOTICS. PT FEELS LIKE INHALER IS NOT HELPING. SHE ALSO THINKS THE ANTIBIOTIC CAUSED A YEAST INFECTION AND SHE IS HAVING SOME SPOTTING, ONLY WHILE WIPING. SYMPTOMS OF YEAST INFECTION STARTED 3 DAYS AGO. PT IS CURRENTLY [place] OF [place] COUNTRY BUT [place]OWN TOMOR[place]. ASKING FOR AN APPT FOR WEDNESDAY.         "

## 2024-09-18 ENCOUNTER — TELEPHONE (OUTPATIENT)
Dept: OBSTETRICS AND GYNECOLOGY | Facility: CLINIC | Age: 29
End: 2024-09-18

## 2024-09-18 NOTE — TELEPHONE ENCOUNTER
"  Hub staff attempted to follow warm transfer process and was unsuccessful     Caller: Camille Alford \"LEONEL\"    Relationship to patient: Self    Best call back number:   Telephone Information:   Mobile [phone]     Patient is needing: PT WAS ABLE TO GET BACK TO TOWN YESTERDAY. WOULD LIKE TO KNOW IF THE APPT SHE HAD SCHEDULED FOR THIS MORNING IS STILL AVAILABLE.   PT WOULD LIKE A CALL BACK   "

## 2024-09-23 ENCOUNTER — ROUTINE PRENATAL (OUTPATIENT)
Dept: OBSTETRICS AND GYNECOLOGY | Facility: CLINIC | Age: 29
End: 2024-09-23
Payer: COMMERCIAL

## 2024-09-23 VITALS — BODY MASS INDEX: 23.81 KG/M2 | DIASTOLIC BLOOD PRESSURE: 73 MMHG | SYSTOLIC BLOOD PRESSURE: 121 MMHG | WEIGHT: 152 LBS

## 2024-09-23 DIAGNOSIS — Z3A.27 27 WEEKS GESTATION OF PREGNANCY: Primary | ICD-10-CM

## 2024-09-23 DIAGNOSIS — N89.8 VAGINAL ITCHING: ICD-10-CM

## 2024-09-23 LAB
GLUCOSE UR STRIP-MCNC: NEGATIVE MG/DL
PROT UR STRIP-MCNC: NEGATIVE MG/DL

## 2024-09-23 PROCEDURE — 0502F SUBSEQUENT PRENATAL CARE: CPT | Performed by: NURSE PRACTITIONER

## 2024-09-23 RX ORDER — ALBUTEROL SULFATE 90 UG/1
2 INHALANT RESPIRATORY (INHALATION)
COMMUNITY
Start: 2024-09-04

## 2024-09-23 RX ORDER — TERCONAZOLE 0.4 %
1 CREAM WITH APPLICATOR VAGINAL NIGHTLY
Qty: 45 G | Refills: 0 | Status: SHIPPED | OUTPATIENT
Start: 2024-09-23 | End: 2024-09-28

## 2024-10-02 ENCOUNTER — ROUTINE PRENATAL (OUTPATIENT)
Dept: OBSTETRICS AND GYNECOLOGY | Facility: CLINIC | Age: 29
End: 2024-10-02
Payer: COMMERCIAL

## 2024-10-02 VITALS — WEIGHT: 152.6 LBS | DIASTOLIC BLOOD PRESSURE: 76 MMHG | SYSTOLIC BLOOD PRESSURE: 113 MMHG | BODY MASS INDEX: 23.9 KG/M2

## 2024-10-02 DIAGNOSIS — Z3A.28 28 WEEKS GESTATION OF PREGNANCY: Primary | ICD-10-CM

## 2024-10-02 DIAGNOSIS — Z34.83 PRENATAL CARE, SUBSEQUENT PREGNANCY IN THIRD TRIMESTER: ICD-10-CM

## 2024-10-02 LAB
GLUCOSE UR STRIP-MCNC: NEGATIVE MG/DL
PROT UR STRIP-MCNC: NEGATIVE MG/DL

## 2024-10-02 NOTE — PROGRESS NOTES
OB follow up     Camille Alford is a 29 y.o.  28w2d being seen today for her obstetrical visit.  Patient reports no complaints. Fetal movement: normal.    Her prenatal care is complicated by (and status): Nothing    Review of Systems  Cramping/contractions : Negative  Vaginal bleeding: Negative  Fetal movement normal    /76   Wt 69.2 kg (152 lb 9.6 oz)   LMP 2024 (Approximate)   BMI 23.90 kg/m²     FHT: 152 BPM   Uterine Size: size equals dates       Assessment    Diagnoses and all orders for this visit:    1. 28 weeks gestation of pregnancy (Primary)  -     POC Urinalysis Dipstick    2. Prenatal care, subsequent pregnancy in third trimester        1) pregnancy at 28w2d         Plan    Reviewed this stage of pregnancy  Problem list updated   Follow up in 4 weeks for growth ultrasound.  She is getting her 1 hour glucose done today.  She will get RSV vaccine and Tdap on her next visit.  She is getting her flu vaccine at work.    Song Norwood MD   10/2/2024  10:26 EDT

## 2024-10-08 ENCOUNTER — TELEPHONE (OUTPATIENT)
Dept: OBSTETRICS AND GYNECOLOGY | Facility: CLINIC | Age: 29
End: 2024-10-08

## 2024-10-08 NOTE — TELEPHONE ENCOUNTER
Called patient, patient stated that she had picked up her 4 year old and the cramps was on going. She stated that she laid down on her left side and drank a bunch of water and went it way. I let the patient know to continue to monitor the cramping and if it comes back painful and on going to call the office so that we can get her in for US or if she has spotting with ongoing cramps to go to l&d

## 2024-10-08 NOTE — TELEPHONE ENCOUNTER
"  Caller: Camille Alford \"LEONEL\"    Relationship: Self    Best call back number: [phone]      Who are you requesting to speak with (clinical staff, DR. FONTANA      What was the call regarding: PATIENT ADVISED THAT SHE IS 29/30 WKS PREGNANT, EXPERIENCING SOME CRAMPING,  FEELS LIKE PERIOD CRAMPS,  SHE IS REQUESTING AN U/S FOR TODAY AND WOULD LIKE TO KNOW WHAT TO DO.  PLEASE ASSIST.       "

## 2024-10-30 ENCOUNTER — ROUTINE PRENATAL (OUTPATIENT)
Dept: OBSTETRICS AND GYNECOLOGY | Facility: CLINIC | Age: 29
End: 2024-10-30
Payer: COMMERCIAL

## 2024-10-30 VITALS — DIASTOLIC BLOOD PRESSURE: 83 MMHG | BODY MASS INDEX: 24.46 KG/M2 | SYSTOLIC BLOOD PRESSURE: 124 MMHG | WEIGHT: 156.2 LBS

## 2024-10-30 DIAGNOSIS — Z34.83 PRENATAL CARE, SUBSEQUENT PREGNANCY IN THIRD TRIMESTER: ICD-10-CM

## 2024-10-30 DIAGNOSIS — Z3A.32 32 WEEKS GESTATION OF PREGNANCY: Primary | ICD-10-CM

## 2024-10-30 LAB
GLUCOSE UR STRIP-MCNC: NEGATIVE MG/DL
PROT UR STRIP-MCNC: NEGATIVE MG/DL

## 2024-10-30 NOTE — PROGRESS NOTES
OB follow up     Camille Alford is a 29 y.o.  32w2d being seen today for her obstetrical visit.  Patient reports no complaints. Fetal movement: normal.  Fetal growth ultrasound done today and shows normal growth.    Her prenatal care is complicated by (and status): Nothing    Review of Systems  Cramping/contractions : Negative  Vaginal bleeding: Negative  Fetal movement is normal    /83   Wt 70.9 kg (156 lb 3.2 oz)   LMP 2024 (Approximate)   BMI 24.46 kg/m²     FHT: 135 BPM   Uterine Size: size equals dates       Assessment    Diagnoses and all orders for this visit:    1. 32 weeks gestation of pregnancy (Primary)  -     POC Urinalysis Dipstick    2. Prenatal care, subsequent pregnancy in third trimester        1) pregnancy at 32w2d         Plan    Reviewed this stage of pregnancy  Problem list updated   Follow up in 2 weeks.  She will get Tdap and RSV vaccines on her next visit.  She may want to have a baseline cervical check on her next visit.    Song Norwood MD   10/30/2024  09:46 EDT

## 2024-11-13 ENCOUNTER — ROUTINE PRENATAL (OUTPATIENT)
Dept: OBSTETRICS AND GYNECOLOGY | Facility: CLINIC | Age: 29
End: 2024-11-13
Payer: COMMERCIAL

## 2024-11-13 VITALS — WEIGHT: 159.6 LBS | SYSTOLIC BLOOD PRESSURE: 118 MMHG | BODY MASS INDEX: 25 KG/M2 | DIASTOLIC BLOOD PRESSURE: 77 MMHG

## 2024-11-13 DIAGNOSIS — Z3A.34 34 WEEKS GESTATION OF PREGNANCY: Primary | ICD-10-CM

## 2024-11-13 LAB
GLUCOSE UR STRIP-MCNC: NEGATIVE MG/DL
PROT UR STRIP-MCNC: NEGATIVE MG/DL

## 2024-11-13 NOTE — PROGRESS NOTES
OB follow up     Camille Alford is a 29 y.o.  34w2d being seen today for her obstetrical visit.  Patient reports occasional contractions. Fetal movement: normal.    Her prenatal care is complicated by (and status): Nothing    Review of Systems  Cramping/contractions : More frequent than on her previous visit.  Vaginal bleeding: Negative  Fetal movement normal    /77   Wt 72.4 kg (159 lb 9.6 oz)   LMP 2024 (Approximate)   BMI 25.00 kg/m²     FHT: 138 BPM   Uterine Size: size equals dates   Cervical exam done today and shows her to be closed/50% effaced and -2 station.    Assessment    Diagnoses and all orders for this visit:    1. 34 weeks gestation of pregnancy (Primary)  -     POC Urinalysis Dipstick  -     Tdap Vaccine Greater Than or Equal To 6yo IM  -     ABRYSVO RSV Vaccine (Adults 60+, pregnant women 32-36 wks)  -     Strep B Screen - Swab, Vaginal/Rectum        1) pregnancy at 34w2d         Plan    Reviewed this stage of pregnancy  Problem list updated   Follow up in 2 weeks.  Reassured by exam and will repeat exam and group B strep on her next visit.    Song Norwood MD   2024  11:05 EST

## 2024-11-26 ENCOUNTER — ROUTINE PRENATAL (OUTPATIENT)
Dept: OBSTETRICS AND GYNECOLOGY | Facility: CLINIC | Age: 29
End: 2024-11-26
Payer: COMMERCIAL

## 2024-11-26 VITALS — WEIGHT: 161.2 LBS | SYSTOLIC BLOOD PRESSURE: 125 MMHG | BODY MASS INDEX: 25.25 KG/M2 | DIASTOLIC BLOOD PRESSURE: 80 MMHG

## 2024-11-26 DIAGNOSIS — Z3A.36 36 WEEKS GESTATION OF PREGNANCY: Primary | ICD-10-CM

## 2024-11-26 LAB
GLUCOSE UR STRIP-MCNC: NEGATIVE MG/DL
PROT UR STRIP-MCNC: NEGATIVE MG/DL

## 2024-11-26 PROCEDURE — 0502F SUBSEQUENT PRENATAL CARE: CPT | Performed by: OBSTETRICS & GYNECOLOGY

## 2024-11-26 NOTE — PROGRESS NOTES
OB follow up     Camille Alford is a 29 y.o.  36w1d being seen today for her obstetrical visit.  Patient reports no complaints. Fetal movement: normal.  Ultrasound today:  Comparisons previous obstetric ultrasound. Today study shows cephalic presentation with normal amniotic fluid volume. Fetal heart rate was 167. Fetal growth today was at 60th percentile for 36 weeks 1 day and abdominal circumference was 91st percentile. Biophysical profile was 8/8.   Her prenatal care is complicated by (and status): Nothing    Review of Systems  Cramping/contractions : Occasional  Vaginal bleeding: Negative  Fetal movement is normal    /80   Wt 73.1 kg (161 lb 3.2 oz)   LMP 2024 (Approximate)   BMI 25.25 kg/m²     FHT: 167 BPM   Uterine Size: size equals dates       Assessment    Diagnoses and all orders for this visit:    1. 36 weeks gestation of pregnancy (Primary)  -     POC Urinalysis Dipstick  -     Strep B Screen - Swab, Vaginal/Rectum        1) pregnancy at 36w1d         Plan    Reviewed this stage of pregnancy  Problem list updated   Follow up in 1 week.  Group B strep screen done today.    Song Norwood MD   2024  15:58 EST

## 2024-11-28 VITALS
BODY MASS INDEX: 25.71 KG/M2 | SYSTOLIC BLOOD PRESSURE: 127 MMHG | HEART RATE: 108 BPM | HEIGHT: 66 IN | OXYGEN SATURATION: 100 % | TEMPERATURE: 97.9 F | RESPIRATION RATE: 18 BRPM | WEIGHT: 160 LBS | DIASTOLIC BLOOD PRESSURE: 86 MMHG

## 2024-11-28 LAB — GP B STREP DNA SPEC QL NAA+PROBE: NEGATIVE

## 2024-11-28 PROCEDURE — 99283 EMERGENCY DEPT VISIT LOW MDM: CPT

## 2024-11-29 ENCOUNTER — HOSPITAL ENCOUNTER (EMERGENCY)
Facility: HOSPITAL | Age: 29
Discharge: HOME | End: 2024-11-29
Attending: EMERGENCY MEDICINE
Payer: COMMERCIAL

## 2024-11-29 DIAGNOSIS — L03.211 FACIAL CELLULITIS: Primary | ICD-10-CM

## 2024-11-29 RX ORDER — CEPHALEXIN 500 MG/1
500 CAPSULE ORAL 4 TIMES DAILY
Qty: 28 CAPSULE | Refills: 0 | Status: SHIPPED | OUTPATIENT
Start: 2024-11-29 | End: 2024-12-06

## 2024-11-29 RX ORDER — CEPHALEXIN 500 MG/1
500 CAPSULE ORAL ONCE
Status: COMPLETED | OUTPATIENT
Start: 2024-11-29 | End: 2024-11-29

## 2024-11-29 RX ADMIN — CEPHALEXIN 500 MG: 500 CAPSULE ORAL at 00:27

## 2024-11-29 NOTE — ED PROVIDER NOTES
EMERGENCY DEPARTMENT ENCOUNTER  Room Number:    PCP: Angelia Jordan APRN  Independent Historians: Patient      HPI:  Chief Complaint: had concerns including Facial Pain and Rash.     A complete HPI/ROS/PMH/PSH/SH/FH are unobtainable due to: None    Chronic or social conditions impacting patient care (Social Determinants of Health): None      Context: Camille Alford is a 29 y.o. female with a medical history of wisdom tooth extraction and  who presents to the ED c/o acute right facial redness and pain.  Patient is currently 36 weeks pregnant.  Over the past few days she has had some nasal congestion and URI type symptoms.  Today, while at work she began noticing some tenderness to her right face in the cheek area.  It felt warm to the touch.  When she got off work, she noticed that there was some new area of redness at that site.  She denies fevers.  She denies injury to the face.  She denies any new tooth aches.  She denies ear pain.  She denies any visual changes.       Review of prior external notes (non-ED) -and- Review of prior external test results outside of this encounter: I independently reviewed the OB/GYN progress note from 2024.  Ultrasound at that time showed cephalic presentation with normal amniotic fluid volume.        PAST MEDICAL HISTORY  Active Ambulatory Problems     Diagnosis Date Noted    No Active Ambulatory Problems     Resolved Ambulatory Problems     Diagnosis Date Noted    No Resolved Ambulatory Problems     No Additional Past Medical History         PAST SURGICAL HISTORY  Past Surgical History:   Procedure Laterality Date    WISDOM TOOTH EXTRACTION           FAMILY HISTORY  History reviewed. No pertinent family history.      SOCIAL HISTORY  Social History     Socioeconomic History    Marital status:    Tobacco Use    Smoking status: Never     Passive exposure: Never    Smokeless tobacco: Never   Vaping Use    Vaping status: Never Used   Substance and  Sexual Activity    Alcohol use: Not Currently     Comment: occ    Drug use: Never    Sexual activity: Yes     Partners: Male     Birth control/protection: None         ALLERGIES  Patient has no known allergies.      REVIEW OF SYSTEMS  Review of Systems  Included in HPI  All systems reviewed and negative except for those discussed in HPI.      PHYSICAL EXAM    I have reviewed the triage vital signs and nursing notes.    ED Triage Vitals   Temp Heart Rate Resp BP SpO2   11/28/24 2354 11/28/24 2354 11/28/24 2354 11/28/24 2358 11/28/24 2354   97.9 °F (36.6 °C) 108 18 127/86 100 %      Temp src Heart Rate Source Patient Position BP Location FiO2 (%)   11/28/24 2354 -- 11/28/24 2358 11/28/24 2358 --   Tympanic  Sitting Right arm        Physical Exam  GENERAL: alert, no acute distress  SKIN: Warm, dry  HENT: Normocephalic, atraumatic, moist mucous membranes.  Intraoral exam is normal.  Posterior pharynx is normal.  Right tympanic membrane is normal.  Right ear canal is normal.  Evaluation of the right facial tissues reveals an area of erythema and slight induration at the right cheek , below but not involving the infraorbital soft tissues.  There is no fluctuance.  There are no vesicles.  There are no bullae.  EYES: no scleral icterus, normal conjunctivae  CV: , regular rate, normal perfusion  RESPIRATORY: normal effort, no stridor  ABDOMEN: soft, nontender, gravid uterus  MUSCULOSKELETAL: no deformity, no asymmetry of extremities  NEURO: alert, moves all extremities, follows commands      LAB RESULTS  No results found for this or any previous visit (from the past 24 hours).      RADIOLOGY  No Radiology Exams Resulted Within Past 24 Hours      MEDICATIONS GIVEN IN ER  Medications   cephalexin (KEFLEX) capsule 500 mg (500 mg Oral Given 11/29/24 0027)         ORDERS PLACED DURING THIS VISIT:  No orders of the defined types were placed in this encounter.        OUTPATIENT MEDICATION MANAGEMENT:  No current Epic-ordered  "facility-administered medications on file.     Current Outpatient Medications Ordered in Epic   Medication Sig Dispense Refill    albuterol sulfate  (90 Base) MCG/ACT inhaler Inhale 2 puffs.      cephalexin (KEFLEX) 500 MG capsule Take 1 capsule by mouth 4 (Four) Times a Day for 7 days. 28 capsule 0    Magnesium Carbonate powder Use.      Prenatal MV-Min-Fe Fum-FA-DHA (PRENATAL 1 PO)            PROCEDURES  Procedures        PROGRESS, DATA ANALYSIS, CONSULTS, AND MEDICAL DECISION MAKING  All labs have been independently interpreted by me.  All radiology studies have been reviewed by me. All EKG's have been independently viewed and interpreted by me.  Discussion below represents my analysis of pertinent findings related to patient's condition, differential diagnosis, treatment plan and final disposition.    Differential diagnosis includes but is not limited to cellulitis, sinusitis, erysipelas, soft tissue abscess, tooth abscess.    Clinical Scores:                   ED Course as of 11/29/24 0139   Fri Nov 29, 2024   0138 Patient is afebrile and nontoxic-appearing.  Physical exam indicates facial soft tissue cellulitis which is seems to be early in presentation.  There does not appear to be any odontogenic etiology.  I think she is safe for discharge home on a course of oral antibiotics.  No need for any diagnostic testing at this time.  Will review with her the usual \"return to ER \"instructions prior to discharge.  Will encourage follow-up with her OB as scheduled. [DOMINIC]      ED Course User Index  [DOMINIC] Watson Alejo MD           AS OF 01:39 EST VITALS:    BP - 127/86  HR - 108  TEMP - 97.9 °F (36.6 °C) (Tympanic)  O2 SATS - 100%    COMPLEXITY OF CARE  Admission was considered but after careful review of the patient's presentation, physical examination, diagnostic results, and response to treatment the patient may be safely discharged with outpatient follow-up.      DIAGNOSIS  Final diagnoses:   Facial " cellulitis         DISPOSITION  ED Disposition       ED Disposition   Discharge    Condition   Stable    Comment   --                Please note that portions of this document were completed with a voice recognition program.    Note Disclaimer: At [facility], we believe that sharing information builds trust and better relationships. You are receiving this note because you recently visited [facility]. It is possible you will see health information before a provider has talked with you about it. This kind of information can be easy to misunderstand. To help you fully understand what it means for your health, we urge you to discuss this note with your provider.         Watson Alejo MD  11/29/24 0139

## 2024-11-29 NOTE — ED TRIAGE NOTES
Pt to ED via PV w/ c/o headeache, nasal drainage, redness to right cheek that is warm to the touch, pain and pressure to right side of face. Pt 36 wks and 4 days gestation.

## 2024-11-29 NOTE — DISCHARGE INSTRUCTIONS
Take antibiotic as directed.  Please return to the emergency room for any worsening pain, fevers, nausea, vomiting, swelling, vision changes or any other concerns.

## 2024-12-05 ENCOUNTER — ROUTINE PRENATAL (OUTPATIENT)
Dept: OBSTETRICS AND GYNECOLOGY | Facility: CLINIC | Age: 29
End: 2024-12-05
Payer: COMMERCIAL

## 2024-12-05 VITALS — SYSTOLIC BLOOD PRESSURE: 119 MMHG | DIASTOLIC BLOOD PRESSURE: 78 MMHG | WEIGHT: 161.8 LBS | BODY MASS INDEX: 26.12 KG/M2

## 2024-12-05 DIAGNOSIS — Z3A.37 37 WEEKS GESTATION OF PREGNANCY: Primary | ICD-10-CM

## 2024-12-05 DIAGNOSIS — Z34.83 PRENATAL CARE, SUBSEQUENT PREGNANCY IN THIRD TRIMESTER: ICD-10-CM

## 2024-12-05 LAB
GLUCOSE UR STRIP-MCNC: NEGATIVE MG/DL
PROT UR STRIP-MCNC: NEGATIVE MG/DL

## 2024-12-05 NOTE — PROGRESS NOTES
OB follow up     Camille Alford is a 29 y.o.  37w3d being seen today for her obstetrical visit.  Patient reports no complaints. Fetal movement: normal.  She has noted more mucus type discharge but no blood.    Her prenatal care is complicated by (and status): Nothing    Review of Systems  Cramping/contractions : Occasional  Vaginal bleeding: Negative  Fetal movement is normal    /78   Wt 73.4 kg (161 lb 12.8 oz)   LMP 2024 (Approximate)   BMI 26.12 kg/m²     FHT: 155 BPM   Uterine Size: size equals dates       Assessment    Diagnoses and all orders for this visit:    1. 37 weeks gestation of pregnancy (Primary)  -     POC Urinalysis Dipstick    2. Prenatal care, subsequent pregnancy in third trimester        1) pregnancy at 37w3d         Plan    Reviewed this stage of pregnancy  Problem list updated   Follow up in 1 week.  We discussed 39-week induction.  Presently, we will see her in 1 week and recheck her cervix and discuss whether she wants to move toward induction of labor or not at 39+ weeks.    Song Norwood MD   2024  11:06 EST

## 2024-12-09 ENCOUNTER — HOSPITAL ENCOUNTER (OUTPATIENT)
Facility: HOSPITAL | Age: 29
Setting detail: OBSERVATION
Discharge: HOME | End: 2024-12-10
Attending: OBSTETRICS & GYNECOLOGY | Admitting: OBSTETRICS & GYNECOLOGY
Payer: COMMERCIAL

## 2024-12-09 PROBLEM — O98.513: Status: ACTIVE | Noted: 2024-12-09

## 2024-12-09 PROBLEM — Z34.90 PREGNANCY: Status: RESOLVED | Noted: 2024-12-09 | Resolved: 2024-12-09

## 2024-12-09 PROBLEM — B34.8 RHINOVIRUS INFECTION: Status: ACTIVE | Noted: 2024-12-09

## 2024-12-09 PROBLEM — Z34.90 PREGNANCY: Status: ACTIVE | Noted: 2024-12-09

## 2024-12-09 LAB
ABO GROUP BLD: NORMAL
ALBUMIN SERPL-MCNC: 3.3 G/DL (ref 3.5–5.2)
ALBUMIN/GLOB SERPL: 0.8 G/DL
ALP SERPL-CCNC: 176 U/L (ref 39–117)
ALT SERPL W P-5'-P-CCNC: 9 U/L (ref 1–33)
ANION GAP SERPL CALCULATED.3IONS-SCNC: 12.6 MMOL/L (ref 5–15)
AST SERPL-CCNC: 16 U/L (ref 1–32)
B PARAPERT DNA SPEC QL NAA+PROBE: NOT DETECTED
B PERT DNA SPEC QL NAA+PROBE: NOT DETECTED
BACTERIA UR QL AUTO: ABNORMAL /HPF
BASOPHILS # BLD AUTO: 0.06 10*3/MM3 (ref 0–0.2)
BASOPHILS NFR BLD AUTO: 0.2 % (ref 0–1.5)
BILIRUB SERPL-MCNC: 0.3 MG/DL (ref 0–1.2)
BILIRUB UR QL STRIP: NEGATIVE
BLD GP AB SCN SERPL QL: NEGATIVE
BUN SERPL-MCNC: 6 MG/DL (ref 6–20)
BUN/CREAT SERPL: 9 (ref 7–25)
C PNEUM DNA NPH QL NAA+NON-PROBE: NOT DETECTED
CALCIUM SPEC-SCNC: 9.3 MG/DL (ref 8.6–10.5)
CHLORIDE SERPL-SCNC: 102 MMOL/L (ref 98–107)
CLARITY UR: CLEAR
CO2 SERPL-SCNC: 21.4 MMOL/L (ref 22–29)
COLOR UR: YELLOW
CREAT SERPL-MCNC: 0.67 MG/DL (ref 0.57–1)
DEPRECATED RDW RBC AUTO: 36.2 FL (ref 37–54)
EGFRCR SERPLBLD CKD-EPI 2021: 120.8 ML/MIN/1.73
EOSINOPHIL # BLD AUTO: 0.07 10*3/MM3 (ref 0–0.4)
EOSINOPHIL NFR BLD AUTO: 0.3 % (ref 0.3–6.2)
ERYTHROCYTE [DISTWIDTH] IN BLOOD BY AUTOMATED COUNT: 12 % (ref 12.3–15.4)
FLUAV SUBTYP SPEC NAA+PROBE: NOT DETECTED
FLUBV RNA ISLT QL NAA+PROBE: NOT DETECTED
GLOBULIN UR ELPH-MCNC: 4.1 GM/DL
GLUCOSE SERPL-MCNC: 81 MG/DL (ref 65–99)
GLUCOSE UR STRIP-MCNC: NEGATIVE MG/DL
HADV DNA SPEC NAA+PROBE: NOT DETECTED
HCOV 229E RNA SPEC QL NAA+PROBE: NOT DETECTED
HCOV HKU1 RNA SPEC QL NAA+PROBE: NOT DETECTED
HCOV NL63 RNA SPEC QL NAA+PROBE: NOT DETECTED
HCOV OC43 RNA SPEC QL NAA+PROBE: NOT DETECTED
HCT VFR BLD AUTO: 37.8 % (ref 34–46.6)
HGB BLD-MCNC: 13.1 G/DL (ref 12–15.9)
HGB UR QL STRIP.AUTO: NEGATIVE
HMPV RNA NPH QL NAA+NON-PROBE: NOT DETECTED
HPIV1 RNA ISLT QL NAA+PROBE: NOT DETECTED
HPIV2 RNA SPEC QL NAA+PROBE: NOT DETECTED
HPIV3 RNA NPH QL NAA+PROBE: NOT DETECTED
HPIV4 P GENE NPH QL NAA+PROBE: NOT DETECTED
HYALINE CASTS UR QL AUTO: ABNORMAL /LPF
IMM GRANULOCYTES # BLD AUTO: 0.4 10*3/MM3 (ref 0–0.05)
IMM GRANULOCYTES NFR BLD AUTO: 1.6 % (ref 0–0.5)
KETONES UR QL STRIP: NEGATIVE
LEUKOCYTE ESTERASE UR QL STRIP.AUTO: ABNORMAL
LYMPHOCYTES # BLD AUTO: 1.04 10*3/MM3 (ref 0.7–3.1)
LYMPHOCYTES NFR BLD AUTO: 4.3 % (ref 19.6–45.3)
M PNEUMO IGG SER IA-ACNC: NOT DETECTED
MCH RBC QN AUTO: 29.4 PG (ref 26.6–33)
MCHC RBC AUTO-ENTMCNC: 34.7 G/DL (ref 31.5–35.7)
MCV RBC AUTO: 84.9 FL (ref 79–97)
MONOCYTES # BLD AUTO: 0.75 10*3/MM3 (ref 0.1–0.9)
MONOCYTES NFR BLD AUTO: 3.1 % (ref 5–12)
NEUTROPHILS NFR BLD AUTO: 21.98 10*3/MM3 (ref 1.7–7)
NEUTROPHILS NFR BLD AUTO: 90.5 % (ref 42.7–76)
NITRITE UR QL STRIP: NEGATIVE
NRBC BLD AUTO-RTO: 0 /100 WBC (ref 0–0.2)
PH UR STRIP.AUTO: 8 [PH] (ref 5–8)
PLATELET # BLD AUTO: 337 10*3/MM3 (ref 140–450)
PMV BLD AUTO: 10.1 FL (ref 6–12)
POTASSIUM SERPL-SCNC: 4.2 MMOL/L (ref 3.5–5.2)
PROT SERPL-MCNC: 7.4 G/DL (ref 6–8.5)
PROT UR QL STRIP: NEGATIVE
RBC # BLD AUTO: 4.45 10*6/MM3 (ref 3.77–5.28)
RBC # UR STRIP: ABNORMAL /HPF
REF LAB TEST METHOD: ABNORMAL
RH BLD: POSITIVE
RHINOVIRUS RNA SPEC NAA+PROBE: DETECTED
RSV RNA NPH QL NAA+NON-PROBE: NOT DETECTED
SARS-COV-2 RNA NPH QL NAA+NON-PROBE: NOT DETECTED
SODIUM SERPL-SCNC: 136 MMOL/L (ref 136–145)
SP GR UR STRIP: 1.01 (ref 1–1.03)
SQUAMOUS #/AREA URNS HPF: ABNORMAL /HPF
T&S EXPIRATION DATE: NORMAL
TREPONEMA PALLIDUM IGG+IGM AB [PRESENCE] IN SERUM OR PLASMA BY IMMUNOASSAY: NORMAL
UROBILINOGEN UR QL STRIP: ABNORMAL
WBC # UR STRIP: ABNORMAL /HPF
WBC NRBC COR # BLD AUTO: 24.3 10*3/MM3 (ref 3.4–10.8)

## 2024-12-09 PROCEDURE — 25810000003 LACTATED RINGERS PER 1000 ML: Performed by: OBSTETRICS & GYNECOLOGY

## 2024-12-09 PROCEDURE — G0378 HOSPITAL OBSERVATION PER HR: HCPCS

## 2024-12-09 PROCEDURE — 86900 BLOOD TYPING SEROLOGIC ABO: CPT | Performed by: OBSTETRICS & GYNECOLOGY

## 2024-12-09 PROCEDURE — 96374 THER/PROPH/DIAG INJ IV PUSH: CPT | Performed by: OBSTETRICS & GYNECOLOGY

## 2024-12-09 PROCEDURE — 59025 FETAL NON-STRESS TEST: CPT

## 2024-12-09 PROCEDURE — 87086 URINE CULTURE/COLONY COUNT: CPT | Performed by: OBSTETRICS & GYNECOLOGY

## 2024-12-09 PROCEDURE — 86780 TREPONEMA PALLIDUM: CPT | Performed by: OBSTETRICS & GYNECOLOGY

## 2024-12-09 PROCEDURE — 0202U NFCT DS 22 TRGT SARS-COV-2: CPT | Performed by: OBSTETRICS & GYNECOLOGY

## 2024-12-09 PROCEDURE — 96361 HYDRATE IV INFUSION ADD-ON: CPT | Performed by: OBSTETRICS & GYNECOLOGY

## 2024-12-09 PROCEDURE — 25010000002 ONDANSETRON PER 1 MG

## 2024-12-09 PROCEDURE — 81001 URINALYSIS AUTO W/SCOPE: CPT | Performed by: OBSTETRICS & GYNECOLOGY

## 2024-12-09 PROCEDURE — 85025 COMPLETE CBC W/AUTO DIFF WBC: CPT | Performed by: OBSTETRICS & GYNECOLOGY

## 2024-12-09 PROCEDURE — 86901 BLOOD TYPING SEROLOGIC RH(D): CPT | Performed by: OBSTETRICS & GYNECOLOGY

## 2024-12-09 PROCEDURE — 80053 COMPREHEN METABOLIC PANEL: CPT | Performed by: OBSTETRICS & GYNECOLOGY

## 2024-12-09 PROCEDURE — 59025 FETAL NON-STRESS TEST: CPT | Performed by: OBSTETRICS & GYNECOLOGY

## 2024-12-09 PROCEDURE — 99285 EMERGENCY DEPT VISIT HI MDM: CPT | Performed by: OBSTETRICS & GYNECOLOGY

## 2024-12-09 PROCEDURE — 86850 RBC ANTIBODY SCREEN: CPT | Performed by: OBSTETRICS & GYNECOLOGY

## 2024-12-09 RX ORDER — SODIUM CHLORIDE 0.9 % (FLUSH) 0.9 %
10 SYRINGE (ML) INJECTION AS NEEDED
Status: DISCONTINUED | OUTPATIENT
Start: 2024-12-09 | End: 2024-12-10 | Stop reason: HOSPADM

## 2024-12-09 RX ORDER — SODIUM CHLORIDE 0.9 % (FLUSH) 0.9 %
10 SYRINGE (ML) INJECTION EVERY 12 HOURS SCHEDULED
Status: DISCONTINUED | OUTPATIENT
Start: 2024-12-09 | End: 2024-12-10 | Stop reason: HOSPADM

## 2024-12-09 RX ORDER — MORPHINE SULFATE 2 MG/ML
2 INJECTION, SOLUTION INTRAMUSCULAR; INTRAVENOUS
Status: DISCONTINUED | OUTPATIENT
Start: 2024-12-09 | End: 2024-12-10 | Stop reason: HOSPADM

## 2024-12-09 RX ORDER — ONDANSETRON 2 MG/ML
INJECTION INTRAMUSCULAR; INTRAVENOUS
Status: COMPLETED
Start: 2024-12-09 | End: 2024-12-09

## 2024-12-09 RX ORDER — ONDANSETRON 2 MG/ML
4 INJECTION INTRAMUSCULAR; INTRAVENOUS ONCE AS NEEDED
Status: DISCONTINUED | OUTPATIENT
Start: 2024-12-09 | End: 2024-12-10 | Stop reason: HOSPADM

## 2024-12-09 RX ORDER — EPHEDRINE SULFATE 50 MG/ML
5 INJECTION, SOLUTION INTRAVENOUS
Status: DISCONTINUED | OUTPATIENT
Start: 2024-12-09 | End: 2024-12-10 | Stop reason: HOSPADM

## 2024-12-09 RX ORDER — LOPERAMIDE HYDROCHLORIDE 2 MG/1
2 CAPSULE ORAL 4 TIMES DAILY PRN
Status: DISCONTINUED | OUTPATIENT
Start: 2024-12-09 | End: 2024-12-10 | Stop reason: HOSPADM

## 2024-12-09 RX ORDER — ONDANSETRON 2 MG/ML
4 INJECTION INTRAMUSCULAR; INTRAVENOUS EVERY 6 HOURS PRN
Status: DISCONTINUED | OUTPATIENT
Start: 2024-12-09 | End: 2024-12-10 | Stop reason: HOSPADM

## 2024-12-09 RX ORDER — SODIUM CHLORIDE, SODIUM LACTATE, POTASSIUM CHLORIDE, CALCIUM CHLORIDE 600; 310; 30; 20 MG/100ML; MG/100ML; MG/100ML; MG/100ML
999 INJECTION, SOLUTION INTRAVENOUS CONTINUOUS
Status: ACTIVE | OUTPATIENT
Start: 2024-12-09 | End: 2024-12-09

## 2024-12-09 RX ORDER — FENTANYL/ROPIVACAINE/NS/PF 2MCG/ML-.2
10 PLASTIC BAG, INJECTION (ML) INJECTION CONTINUOUS
Status: DISCONTINUED | OUTPATIENT
Start: 2024-12-09 | End: 2024-12-10 | Stop reason: HOSPADM

## 2024-12-09 RX ADMIN — SODIUM CHLORIDE, SODIUM LACTATE, POTASSIUM CHLORIDE, CALCIUM CHLORIDE 999 ML/HR: 20; 30; 600; 310 INJECTION, SOLUTION INTRAVENOUS at 16:28

## 2024-12-09 RX ADMIN — SODIUM CHLORIDE, SODIUM LACTATE, POTASSIUM CHLORIDE, CALCIUM CHLORIDE 999 ML/HR: 20; 30; 600; 310 INJECTION, SOLUTION INTRAVENOUS at 17:21

## 2024-12-09 RX ADMIN — ONDANSETRON 4 MG: 2 INJECTION, SOLUTION INTRAMUSCULAR; INTRAVENOUS at 19:37

## 2024-12-09 NOTE — OBED NOTES
[facility] Note OB        Patient Name: Camille Alford  YOB: 1995  MRN: [id number]  Admission Date: 2024 12:43 PM  Date of Service: 2024    Chief Complaint: Laboring (Patient states she has had contractions possibly since last night but has really started feeling them since 0830. Pink tinge discharge, +Fm, -LF)        Subjective     Camille Alford is a 29 y.o. female  at 38w0d with Estimated Date of Delivery: 24 who presents with the chief complaint listed above.  She has had painful contractions and intermittent diarrhea since 0830 this morning. She has some nausea but no vomiting.  She has a mild cough that she attributes to allergies.  She sees Song Norwood MD for her prenatal care. She has a history of rapid labor and a third degree perineal laceration with her first delivery.    She describes fetal movement as normal.  She denies rupture of membranes.  She denies vaginal bleeding. She is feeling contractions.          Objective   There are no active problems to display for this patient.       OB History    Para Term  AB Living   3 1 1 0 1 1   SAB IAB Ectopic Molar Multiple Live Births   1 0 0 0 0 1      # Outcome Date GA Lbr Ryan/2nd Weight Sex Type Anes PTL Lv   3 Current            2 SAB      SAB  N    1 Term    7 g (0.3 oz) F Vag-Spont  N JOSE L      Complications: Third degree laceration of perineum during delivery, postpartum        History reviewed. No pertinent past medical history.    Past Surgical History:   Procedure Laterality Date    WISDOM TOOTH EXTRACTION         No current facility-administered medications on file prior to encounter.     Current Outpatient Medications on File Prior to Encounter   Medication Sig Dispense Refill    Prenatal MV-Min-Fe Fum-FA-DHA (PRENATAL 1 PO)       albuterol sulfate  (90 Base) MCG/ACT inhaler Inhale 2 puffs.      Magnesium Carbonate powder Use.         No Known Allergies    History reviewed. No pertinent family  "history.    Social History     Socioeconomic History    Marital status:    Tobacco Use    Smoking status: Never     Passive exposure: Never    Smokeless tobacco: Never   Vaping Use    Vaping status: Never Used   Substance and Sexual Activity    Alcohol use: Not Currently     Comment: occ    Drug use: Never    Sexual activity: Yes     Partners: Male     Birth control/protection: None           Review of Systems   Constitutional: Negative.    HENT: Negative.     Respiratory:  Positive for cough.    Cardiovascular: Negative.    Gastrointestinal:  Positive for abdominal pain, diarrhea and nausea. Negative for vomiting.   Genitourinary: Negative.    Neurological: Negative.           PHYSICAL EXAM:      VITAL SIGNS:  Vitals:    12/09/24 1302 12/09/24 1308   BP: 115/75    BP Location: Right arm    Patient Position: Sitting    Pulse: 113    Resp: 18    Temp: 98.2 °F (36.8 °C)    TempSrc: Oral    SpO2: 98%    Weight:  73.5 kg (162 lb)   Height:  170.2 cm (67\")        FHT:                   Baseline:  130 BPM  Variability:  Moderate = 6 - 25 BPM  Accelerations:  15 x 15 accelerations present     Decelerations:  absent  Contractions:   present     Interpretation:    Reactive NST, CAT 1 tracing        PHYSICAL EXAM:    General: well developed; well nourished  mentation appropriate   Heart: Not performed.   Lungs  : breathing is unlabored     Abdomen: soft, non-tender; no masses  no umbilical or inguinal hernias are present  no hepato-splenomegaly       Cervix: was checked (by RN): 1-2 cm / 60 % / -2  Cervical Dilation (cm): 1-2  Cervical Effacement: 60  Fetal Station: -2  Cervical Consistency: soft  Cervical Position: mid-position   Contractions: every 2 minutes        Extremities: peripheral pulses normal, no pedal edema, no clubbing or cyanosis      LABS AND TESTING ORDERED:  Uterine and fetal monitoring  Urinalysis      LAB RESULTS:    Recent Results (from the past 24 hours)   Urinalysis With Culture If Indicated - " Urine, Clean Catch    Collection Time: 12/09/24  1:15 PM    Specimen: Urine, Clean Catch   Result Value Ref Range    Color, UA Yellow Yellow, Straw    Appearance, UA Clear Clear    pH, UA 8.0 5.0 - 8.0    Specific Gravity, UA 1.008 1.005 - 1.030    Glucose, UA Negative Negative    Ketones, UA Negative Negative    Bilirubin, UA Negative Negative    Blood, UA Negative Negative    Protein, UA Negative Negative    Leuk Esterase, UA Moderate (2+) (A) Negative    Nitrite, UA Negative Negative    Urobilinogen, UA 0.2 E.U./dL 0.2 - 1.0 E.U./dL   Urinalysis, Microscopic Only - Urine, Clean Catch    Collection Time: 12/09/24  1:15 PM    Specimen: Urine, Clean Catch   Result Value Ref Range    RBC, UA 0-2 None Seen, 0-2 /HPF    WBC, UA 0-2 None Seen, 0-2 /HPF    Bacteria, UA 1+ (A) None Seen /HPF    Squamous Epithelial Cells, UA 3-6 (A) None Seen, 0-2 /HPF    Hyaline Casts, UA None Seen None Seen /LPF    Methodology Automated Microscopy    Comprehensive Metabolic Panel    Collection Time: 12/09/24  4:28 PM    Specimen: Blood   Result Value Ref Range    Glucose 81 65 - 99 mg/dL    BUN 6 6 - 20 mg/dL    Creatinine 0.67 0.57 - 1.00 mg/dL    Sodium 136 136 - 145 mmol/L    Potassium 4.2 3.5 - 5.2 mmol/L    Chloride 102 98 - 107 mmol/L    CO2 21.4 (L) 22.0 - 29.0 mmol/L    Calcium 9.3 8.6 - 10.5 mg/dL    Total Protein 7.4 6.0 - 8.5 g/dL    Albumin 3.3 (L) 3.5 - 5.2 g/dL    ALT (SGPT) 9 1 - 33 U/L    AST (SGOT) 16 1 - 32 U/L    Alkaline Phosphatase 176 (H) 39 - 117 U/L    Total Bilirubin 0.3 0.0 - 1.2 mg/dL    Globulin 4.1 gm/dL    A/G Ratio 0.8 g/dL    BUN/Creatinine Ratio 9.0 7.0 - 25.0    Anion Gap 12.6 5.0 - 15.0 mmol/L    eGFR 120.8 >60.0 mL/min/1.73   CBC Auto Differential    Collection Time: 12/09/24  4:28 PM    Specimen: Blood   Result Value Ref Range    WBC 24.30 (H) 3.40 - 10.80 10*3/mm3    RBC 4.45 3.77 - 5.28 10*6/mm3    Hemoglobin 13.1 12.0 - 15.9 g/dL    Hematocrit 37.8 34.0 - 46.6 %    MCV 84.9 79.0 - 97.0 fL    MCH  29.4 26.6 - 33.0 pg    MCHC 34.7 31.5 - 35.7 g/dL    RDW 12.0 (L) 12.3 - 15.4 %    RDW-SD 36.2 (L) 37.0 - 54.0 fl    MPV 10.1 6.0 - 12.0 fL    Platelets 337 140 - 450 10*3/mm3    Neutrophil % 90.5 (H) 42.7 - 76.0 %    Lymphocyte % 4.3 (L) 19.6 - 45.3 %    Monocyte % 3.1 (L) 5.0 - 12.0 %    Eosinophil % 0.3 0.3 - 6.2 %    Basophil % 0.2 0.0 - 1.5 %    Immature Grans % 1.6 (H) 0.0 - 0.5 %    Neutrophils, Absolute 21.98 (H) 1.70 - 7.00 10*3/mm3    Lymphocytes, Absolute 1.04 0.70 - 3.10 10*3/mm3    Monocytes, Absolute 0.75 0.10 - 0.90 10*3/mm3    Eosinophils, Absolute 0.07 0.00 - 0.40 10*3/mm3    Basophils, Absolute 0.06 0.00 - 0.20 10*3/mm3    Immature Grans, Absolute 0.40 (H) 0.00 - 0.05 10*3/mm3    nRBC 0.0 0.0 - 0.2 /100 WBC       Lab Results   Component Value Date    ABO B 2024    RH Positive 2024       Lab Results   Component Value Date    STREPGPB Negative 2024                 Assessment & Plan     ASSESSMENT/PLAN:  Camille Alford is a 29 y.o. female  at 38w0d who presented with:     Contractions  Leukocytosis      PLAN:     No change in cervical exam after three hours  Patient not comfortable with discharge due to history of rapid delivery.  She will be admitted overnight for observation.  Respiratory panel ordered for cough.    I have spent 30 minutes including face to face time with the patient, greater than 50% in discussion of the diagnosis (counseling) and/or coordination of care.     Kirsten Orellana MD  2024  17:37 EST  OB Hospitalist  Phone:  [phone]

## 2024-12-10 VITALS
SYSTOLIC BLOOD PRESSURE: 100 MMHG | WEIGHT: 162 LBS | BODY MASS INDEX: 25.43 KG/M2 | DIASTOLIC BLOOD PRESSURE: 71 MMHG | TEMPERATURE: 98 F | HEIGHT: 67 IN | OXYGEN SATURATION: 98 % | HEART RATE: 123 BPM | RESPIRATION RATE: 18 BRPM

## 2024-12-10 PROBLEM — O47.1 FALSE LABOR AFTER 37 WEEKS OF GESTATION WITHOUT DELIVERY: Status: ACTIVE | Noted: 2024-12-10

## 2024-12-10 PROCEDURE — G0378 HOSPITAL OBSERVATION PER HR: HCPCS

## 2024-12-10 PROCEDURE — 96376 TX/PRO/DX INJ SAME DRUG ADON: CPT

## 2024-12-10 PROCEDURE — 25010000002 ONDANSETRON PER 1 MG: Performed by: OBSTETRICS & GYNECOLOGY

## 2024-12-10 RX ADMIN — ONDANSETRON 4 MG: 2 INJECTION, SOLUTION INTRAMUSCULAR; INTRAVENOUS at 04:24

## 2024-12-10 NOTE — PLAN OF CARE
Problem: Adult Inpatient Plan of Care  Goal: Plan of Care Review  12/10/2024 0522 by Aura Brooks, RN  Outcome: Progressing  Flowsheets (Taken 12/10/2024 0522)  Progress: no change  Outcome Evaluation: Pt admitted through [facility] for prolonged monitoring for cervical change. Pt has a Hx of fast labor. EFM reassuring. Ctx irregular. pt on regular diet. Pain management reviewed when pain assessed. Pt on droplet precautions for positive rhinovirus.  Plan of Care Reviewed With: patient  12/10/2024 0522 by Aura Brooks, RN  Outcome: Progressing  Flowsheets (Taken 12/10/2024 0522)  Progress: no change  Outcome Evaluation: Pt admitted through [facility] for prolonged monitoring for cervical change. Pt has a Hx of fast labor. EFM reassuring. Ctx irregular. pt on regular diet. Pain management reviewed when pain assessed. Pt on droplet precautions for positive rhinovirus.  Plan of Care Reviewed With: patient  Goal: Patient-Specific Goal (Individualized)  12/10/2024 0522 by Aura Brooks RN  Outcome: Progressing  Flowsheets (Taken 12/10/2024 0522)  Individualized Care Needs: Education about changes in labor  Anxieties, Fears or Concerns: Worries about going home and having the baby before getting to the hospital. Worried about having the Rhinovirus.  12/10/2024 0522 by Aura Brooks RN  Outcome: Progressing  Flowsheets (Taken 12/10/2024 0522)  Individualized Care Needs: Education about changes in labor  Anxieties, Fears or Concerns: Worries about going home and having the baby before getting to the hospital. Worried about having the Rhinovirus.  Goal: Absence of Hospital-Acquired Illness or Injury  12/10/2024 0522 by Aura Brooks, RN  Outcome: Progressing  12/10/2024 0522 by Aura Brooks, RN  Outcome: Progressing  Intervention: Identify and Manage Fall Risk  Recent Flowsheet Documentation  Taken 12/10/2024 0420 by Aura Brooks, RN  Safety Promotion/Fall Prevention: safety round/check completed  Taken 12/9/2024 2301 by Aura Brooks, RN  Safety  Promotion/Fall Prevention: safety round/check completed  Taken 12/9/2024 2000 by Aura Brooks RN  Safety Promotion/Fall Prevention: safety round/check completed  Intervention: Prevent Skin Injury  Recent Flowsheet Documentation  Taken 12/9/2024 2000 by Aura Brooks RN  Body Position: sitting up in bed  Goal: Optimal Comfort and Wellbeing  12/10/2024 0522 by Aura Brooks RN  Outcome: Progressing  12/10/2024 0522 by Aura Brooks RN  Outcome: Progressing  Intervention: Monitor Pain and Promote Comfort  Recent Flowsheet Documentation  Taken 12/10/2024 0420 by Aura Brooks RN  Pain Management Interventions: pain management plan reviewed with patient/caregiver  Taken 12/9/2024 2301 by Aura Brooks RN  Pain Management Interventions: pain management plan reviewed with patient/caregiver  Taken 12/9/2024 2000 by Aura Brooks RN  Pain Management Interventions: pain management plan reviewed with patient/caregiver  Intervention: Provide Person-Centered Care  Recent Flowsheet Documentation  Taken 12/9/2024 2000 by Aura Brooks RN  Trust Relationship/Rapport:   care explained   choices provided   emotional support provided   empathic listening provided   questions answered   questions encouraged   reassurance provided   thoughts/feelings acknowledged  Goal: Readiness for Transition of Care  12/10/2024 0522 by Aura Brooks RN  Outcome: Progressing  12/10/2024 0522 by Aura Brooks RN  Outcome: Progressing     Problem: Labor  Goal: Hemostasis  Outcome: Progressing  Goal: Stable Fetal Wellbeing  Outcome: Progressing  Intervention: Promote and Monitor Fetal Wellbeing  Recent Flowsheet Documentation  Taken 12/9/2024 2000 by Aura Brooks RN  Body Position: sitting up in bed  Goal: Effective Progression to Delivery  Outcome: Progressing  Goal: Absence of Infection Signs and Symptoms  Outcome: Progressing  Goal: Acceptable Pain Control  Outcome: Progressing  Goal: Normal Uterine Contraction Pattern  Outcome: Progressing   Goal Outcome  Evaluation:  Plan of Care Reviewed With: patient        Progress: no change  Outcome Evaluation: Pt admitted through [facility] for prolonged monitoring for cervical change. Pt has a Hx of fast labor. EFM reassuring. Ctx irregular. pt on regular diet. Pain management reviewed when pain assessed. Pt on droplet precautions for positive rhinovirus.

## 2024-12-10 NOTE — DISCHARGE SUMMARY
[facility]         DISCHARGE SUMMARY    Patient Name: Camille Alford  : 1995  MRN: [id number]    Date of Admission: 2024  Date of Discharge:  12/10/24  Primary Care Physician: Angelia Jordan APRN    Consults       No orders found for last 30 day(s).            Presenting Problem:   Pregnancy [Z34.90]    Active and Resolved Hospital Problems:  Active Hospital Problems    Diagnosis POA    **Maternal viral disease affecting pregnancy in third trimester [O98.513] Yes    False labor after 37 weeks of gestation without delivery [O47.1] No    Rhinovirus infection [B34.8] Yes      Resolved Hospital Problems    Diagnosis POA    Pregnancy [Z34.90] Not Applicable         Hospital Course     Hospital Course:  Camille Alford is a 29 y.o. female presented with frequent painful contractions, but was making no cervical change at 2 cm dilated.  She was also having nausea, vomiting, and diarrhea.  Respiratory panel was positive for rhinovirus.  She was admitted overnight for observation and to see if her labor progressed.  She did not make any cervical change overnight and her nausea, vomiting, diarrhea improved.  Contractions spaced out some and the patient was feeling more comfortable.  By hospital day #2 she was meeting milestones for discharge.  Extensive discharge instructions given to the patient.    Day of Discharge     Vitals:  Temp:  [98 °F (36.7 °C)-98.6 °F (37 °C)] 98 °F (36.7 °C)  Heart Rate:  [113-129] 123  Resp:  [18-20] 18  BP: (100-131)/(61-75) 100/71  Physical Exam: See progress notes      Pertinent  and/or Most Recent Results     LAB RESULTS:      Lab 24  1628   WBC 24.30*   HEMOGLOBIN 13.1   HEMATOCRIT 37.8   PLATELETS 337   NEUTROS ABS 21.98*   IMMATURE GRANS (ABS) 0.40*   LYMPHS ABS 1.04   MONOS ABS 0.75   EOS ABS 0.07   MCV 84.9         Lab 24  1628   SODIUM 136   POTASSIUM 4.2   CHLORIDE 102   CO2 21.4*   ANION GAP 12.6   BUN 6   CREATININE 0.67   EGFR 120.8    GLUCOSE 81   CALCIUM 9.3         Lab 12/09/24  1628   TOTAL PROTEIN 7.4   ALBUMIN 3.3*   GLOBULIN 4.1   ALT (SGPT) 9   AST (SGOT) 16   BILIRUBIN 0.3   ALK PHOS 176*                 Lab 12/09/24  1727   ABO TYPING B   RH TYPING Positive   ANTIBODY SCREEN Negative         Brief Urine Lab Results  (Last result in the past 365 days)        Color   Clarity   Blood   Leuk Est   Nitrite   Protein   CREAT   Urine HCG        12/09/24 1315 Yellow   Clear   Negative   Moderate (2+)   Negative   Negative                 Microbiology Results (last 10 days)       Procedure Component Value - Date/Time    Respiratory Panel PCR w/COVID-19(SARS-CoV-2) [facility] In-House, NP Swab in UTM/VTM, 2 HR TAT - Swab, Nasopharynx [[id number]]  (Abnormal) Collected: 12/09/24 1722    Lab Status: Final result Specimen: Swab from Nasopharynx Updated: 12/09/24 1854     ADENOVIRUS, PCR Not Detected     Coronavirus 229E Not Detected     Coronavirus HKU1 Not Detected     Coronavirus NL63 Not Detected     Coronavirus OC43 Not Detected     COVID19 Not Detected     Human Metapneumovirus Not Detected     Human Rhinovirus/Enterovirus Detected     Influenza A PCR Not Detected     Influenza B PCR Not Detected     Parainfluenza Virus 1 Not Detected     Parainfluenza Virus 2 Not Detected     Parainfluenza Virus 3 Not Detected     Parainfluenza Virus 4 Not Detected     RSV, PCR Not Detected     Bordetella pertussis pcr Not Detected     Bordetella parapertussis PCR Not Detected     Chlamydophila pneumoniae PCR Not Detected     Mycoplasma pneumo by PCR Not Detected    Narrative:      In the setting of a positive respiratory panel with a viral infection PLUS a negative procalcitonin without other underlying concern for bacterial infection, consider observing off antibiotics or discontinuation of antibiotics and continue supportive care. If the respiratory panel is positive for atypical bacterial infection (Bordetella pertussis, Chlamydophila  pneumoniae, or Mycoplasma pneumoniae), consider antibiotic de-escalation to target atypical bacterial infection.                             Tests Pending at Discharge:  Pending Results       Procedure [Order ID] Specimen - Date/Time    Urine Culture - Urine, Urine, Clean Catch [[id number]] Collected: 12/09/24 1315    Specimen: Urine, Clean Catch Updated: 12/09/24 1332                Discharge Details        Discharge Medications        Continue These Medications        Instructions Start Date   albuterol sulfate  (90 Base) MCG/ACT inhaler  Commonly known as: PROVENTIL HFA;VENTOLIN HFA;PROAIR HFA   2 puffs      Magnesium Carbonate powder   Use.      PRENATAL 1 PO                No Known Allergies      Discharge Disposition:  Home or Self Care    Diet:  Hospital:  Diet Order   Procedures    Diet: Regular/House; Fluid Consistency: Thin (IDDSI 0)         Discharge Activity:   Activity Instructions       Activity as Tolerated                CODE STATUS:  Code Status and Medical Interventions: CPR (Attempt to Resuscitate); Full   Ordered at: 12/09/24 2042     Code Status (Patient has no pulse and is not breathing):    CPR (Attempt to Resuscitate)     Medical Interventions (Patient has pulse or is breathing):    Full         Future Appointments   Date Time Provider Department Center   12/11/2024 11:30 AM Song Norwood MD [facility]       Additional Instructions for the Follow-ups that You Need to Schedule       Discharge Follow-up with Specified Provider: Dr. Norwood; 2 Days   As directed      To: Dr. Norwood   Follow Up: 2 Days                Time spent on Discharge including face to face service:  Greater than 30 minutes    Liborio Gordon MD

## 2024-12-10 NOTE — PROGRESS NOTES
[facility]     Progress Note    Patient Name: Camille Alford  : 1995  MRN: [id number]  Primary Care Physician:  Angelia Jordan APRN  Date of admission: 2024    Subjective   Subjective     Chief Complaint: Contractions    HPI:  Patient Reports that she feels better this morning compared to last night.  She says the vomiting has resolved.  Contractions have improved and are not as frequent as it had been when she first arrived.  She is still feeling contractions but less intense and no longer every 2-3 minutes upon arrival.  She has had some scant spotting.  Denies leakage of fluid.  Reports normal fetal movement.  She has remained afebrile overnight.    Objective   Objective     Vitals:   Temp:  [98 °F (36.7 °C)-98.6 °F (37 °C)] 98 °F (36.7 °C)  Heart Rate:  [113-129] 123  Resp:  [18-20] 18  BP: (100-131)/(61-75) 100/71  Physical Exam     General: No acute distress, awake and oriented x3   HEENT: Normocephalic atraumatic, moist mucous membranes   Eyes: Pupils equal round reactive to light and accommodation, extraocular muscles intact   Respiratory: Normal work of breathing, good air movement   Abdomen: Gravid, soft, nontender   Pelvic exam performed in the presence of female RN chaperone.  Patient provided verbal consent to proceed with the exam.   Pelvic exam: Normal external female genitalia, no lesions, no leakage of fluid   Cervix: 2 cm dilated/60% effaced/-3.  She is some scant brown/bloody discharge noted during the exam    Extremities: No calf tenderness, no lower extremity edema   Psychiatric: Good judgment and insight, normal affect and mood   Neurologic: Cranial nerves II through XII intact, no deficits   Skin: No rashes or lesions       Result Review    Result Review:  I have personally reviewed the results from the time of this admission to 12/10/2024 07:42 EST and agree with these findings:  []  Laboratory list / accordion  []  Microbiology  []  Radiology  []  EKG/Telemetry   []   Cardiology/Vascular   []  Pathology  []  Old records  [x]  Other: NST/CST  Most notable findings include:     NST:   140/reactive/moderate variability/no decels  [place]: contractions every 3-5 minutes    Given the frequency of her contractions, patient actually has spontaneous contraction stress test over several 10-minute intervals.  Results of the spontaneous contraction stress test is reactive and negative.    Assessment & Plan   Assessment / Plan     Brief Patient Summary:  Camille Alford is a 29 y.o. female  3 para 1 at 38 and 1 sevenths weeks gestational age with false labor at term  2.  Rhinovirus infection    Active Hospital Problems:  Active Hospital Problems    Diagnosis     **Maternal viral disease affecting pregnancy in third trimester     Rhinovirus infection      Plan:   1.  Patient has not made any cervical change since the time of her admission around 1 PM yesterday.  Contractions have spaced out some.  Do not feel that the patient is an active labor at this time.  I would recommend discharge home with close monitoring for progressive labor signs.  Labor signs discussed with the patient at length.  All questions answered and she agrees to the plan for discharge.  2.  Regarding rhinovirus infection, her symptoms are improving.  I feel rhinovirus and the subsequent nausea, vomiting, and diarrhea that she was experiencing was likely the reason for her contractions.  N/V/D improved now.  May just have supportive care at home.  Encourage oral hydration.    Would recommend that the patient keep her regularly scheduled appointment in 2 days.  Encouraged to wear a mask when she comes to the office.  Patient stable for discharge home at this time.    VTE Prophylaxis:  Mechanical VTE prophylaxis orders are present.        CODE STATUS:   Code Status (Patient has no pulse and is not breathing): CPR (Attempt to Resuscitate)  Medical Interventions (Patient has pulse or is breathing): Full    Disposition:  I  expect patient to be discharged today.    Liborio Gordon MD

## 2024-12-10 NOTE — H&P
[facility]   HISTORY AND PHYSICAL    Patient Name: Camille Alford  : 1995  MRN: [id number]  Primary Care Physician:  Angelia Jordan APRN  Date of admission: 2024    Subjective   Subjective     Chief Complaint: COntractions    HPI:    Camille Alford is a 29 y.o. female patient presents with complaint of contractions about the last 3-4 hours.  They have continued since she was here in the hospital.  She reports feeling about every 2-3 minutes.  She denies fevers and chills.  She has been experiencing some nausea throughout the day, and more recently has experienced an episode of emesis.  She says she thinks this was just a reaction to the pain of the contraction.  She has been experiencing some diarrhea throughout the day, but also thinks this is related to her contractions.  She reports active fetal movement.  She denies vaginal bleeding or leakage of fluid.    Review of Systems   Review of systems:  Constitutional: No fevers, chills, sweats   Eye: No recent visual problems, denies blurry vision   HEENT: No ear pain, nasal congestion, sore throat, voice changes   Respiratory: No shortness of breath, cough, pain on breathing   Cardiovascular: No Chest pain, palpitations   Gastrointestinal: Pos nausea, vomiting, diarrhea  Genitourinary: No hematuria, dysuria, lesions on genitalia   Hema/Lymph: Negative for bruising, no edema   Endocrine: Negative for excessive thirst, excessive hunger, heat or cold intolerance   Musculoskeletal: No joint pain, muscle pain, decreased range of motion   Integumentary: No rash, pruritus, abrasions, lesions   Neurologic: No weakness, numbness, headaches   Psychiatric: No anxiety, depression, mood changes         Personal History     History reviewed. No pertinent past medical history.    Past Surgical History:   Procedure Laterality Date    WISDOM TOOTH EXTRACTION         Family History: family history is not on file. Otherwise pertinent FHx was reviewed and not  pertinent to current issue.    Social History:  reports that she has never smoked. She has never been exposed to tobacco smoke. She has never used smokeless tobacco. She reports that she does not currently use alcohol. She reports that she does not use drugs.    Home Medications:  Magnesium Carbonate, Prenatal MV-Min-Fe Fum-FA-DHA, and albuterol sulfate HFA      Allergies:  No Known Allergies    Objective   Objective     Vitals:   Temp:  [98.2 °F (36.8 °C)] 98.2 °F (36.8 °C)  Heart Rate:  [113] 113  Resp:  [18] 18  BP: (115)/(75) 115/75  Physical Exam     General: No acute distress, awake and oriented x3   HEENT: Normocephalic atraumatic, moist mucous membranes   Eyes: Pupils equal round reactive to light and accommodation, extraocular muscles intact   Respiratory: Normal work of breathing, good air movement   Cardiovascular: Regular rate and rhythm, no murmurs   Abdomen: Gravid, soft, nontender   Pelvic exam performed by RN  Pelvic exam: Normal external female genitalia, no lesions, no leakage of fluid   Cervix: 1-2 cm dilated/60% effaced/-2 at presentation at 1 PM.  Cervical exam repeated after 3 and 6 hours with no change.   (Please note there is a cervical exam documented from 12/9/2024 at 0700, however patient was not at the hospital at that time.  I believe this was an erroneous documentation and was meant for 7 PM, when a cervical exam had been repeated by the nurse.)  Cephalic by palpation of sutures   Extremities: No calf tenderness, no lower extremity edema   Psychiatric: Good judgment and insight, normal affect and mood   Neurologic: Cranial nerves II through XII intact, no deficits   Skin: No rashes or lesions       Result Review    Result Review:  I have personally reviewed the results from the time of this admission to 12/9/2024 19:39 EST and agree with these findings:  [x]  Laboratory list / accordion  [x]  Microbiology  [x]  Radiology  []  EKG/Telemetry   []  Cardiology/Vascular   []  Pathology  []   Old records  []  Other: NST  Most notable findings include:     Admission on 12/09/2024   Component Date Value Ref Range Status    Color, UA 12/09/2024 Yellow  Yellow, Straw Final    Appearance, UA 12/09/2024 Clear  Clear Final    pH, UA 12/09/2024 8.0  5.0 - 8.0 Final    Specific Gravity, UA 12/09/2024 1.008  1.005 - 1.030 Final    Glucose, UA 12/09/2024 Negative  Negative Final    Ketones, UA 12/09/2024 Negative  Negative Final    Bilirubin, UA 12/09/2024 Negative  Negative Final    Blood, UA 12/09/2024 Negative  Negative Final    Protein, UA 12/09/2024 Negative  Negative Final    Leuk Esterase, UA 12/09/2024 Moderate (2+) (A)  Negative Final    Nitrite, UA 12/09/2024 Negative  Negative Final    Urobilinogen, UA 12/09/2024 0.2 E.U./dL  0.2 - 1.0 E.U./dL Final    RBC, UA 12/09/2024 0-2  None Seen, 0-2 /HPF Final    WBC, UA 12/09/2024 0-2  None Seen, 0-2 /HPF Final    Bacteria, UA 12/09/2024 1+ (A)  None Seen /HPF Final    Squamous Epithelial Cells, UA 12/09/2024 3-6 (A)  None Seen, 0-2 /HPF Final    Hyaline Casts, UA 12/09/2024 None Seen  None Seen /LPF Final    Methodology 12/09/2024 Automated Microscopy   Final    Glucose 12/09/2024 81  65 - 99 mg/dL Final    BUN 12/09/2024 6  6 - 20 mg/dL Final    Creatinine 12/09/2024 0.67  0.57 - 1.00 mg/dL Final    Sodium 12/09/2024 136  136 - 145 mmol/L Final    Potassium 12/09/2024 4.2  3.5 - 5.2 mmol/L Final    Chloride 12/09/2024 102  98 - 107 mmol/L Final    CO2 12/09/2024 21.4 (L)  22.0 - 29.0 mmol/L Final    Calcium 12/09/2024 9.3  8.6 - 10.5 mg/dL Final    Total Protein 12/09/2024 7.4  6.0 - 8.5 g/dL Final    Albumin 12/09/2024 3.3 (L)  3.5 - 5.2 g/dL Final    ALT (SGPT) 12/09/2024 9  1 - 33 U/L Final    AST (SGOT) 12/09/2024 16  1 - 32 U/L Final    Alkaline Phosphatase 12/09/2024 176 (H)  39 - 117 U/L Final    Total Bilirubin 12/09/2024 0.3  0.0 - 1.2 mg/dL Final    Globulin 12/09/2024 4.1  gm/dL Final    A/G Ratio 12/09/2024 0.8  g/dL Final    BUN/Creatinine Ratio  12/09/2024 9.0  7.0 - 25.0 Final    Anion Gap 12/09/2024 12.6  5.0 - 15.0 mmol/L Final    eGFR 12/09/2024 120.8  >60.0 mL/min/1.73 Final    WBC 12/09/2024 24.30 (H)  3.40 - 10.80 10*3/mm3 Final    RBC 12/09/2024 4.45  3.77 - 5.28 10*6/mm3 Final    Hemoglobin 12/09/2024 13.1  12.0 - 15.9 g/dL Final    Hematocrit 12/09/2024 37.8  34.0 - 46.6 % Final    MCV 12/09/2024 84.9  79.0 - 97.0 fL Final    MCH 12/09/2024 29.4  26.6 - 33.0 pg Final    MCHC 12/09/2024 34.7  31.5 - 35.7 g/dL Final    RDW 12/09/2024 12.0 (L)  12.3 - 15.4 % Final    RDW-SD 12/09/2024 36.2 (L)  37.0 - 54.0 fl Final    MPV 12/09/2024 10.1  6.0 - 12.0 fL Final    Platelets 12/09/2024 337  140 - 450 10*3/mm3 Final    Neutrophil % 12/09/2024 90.5 (H)  42.7 - 76.0 % Final    Lymphocyte % 12/09/2024 4.3 (L)  19.6 - 45.3 % Final    Monocyte % 12/09/2024 3.1 (L)  5.0 - 12.0 % Final    Eosinophil % 12/09/2024 0.3  0.3 - 6.2 % Final    Basophil % 12/09/2024 0.2  0.0 - 1.5 % Final    Immature Grans % 12/09/2024 1.6 (H)  0.0 - 0.5 % Final    Neutrophils, Absolute 12/09/2024 21.98 (H)  1.70 - 7.00 10*3/mm3 Final    Lymphocytes, Absolute 12/09/2024 1.04  0.70 - 3.10 10*3/mm3 Final    Monocytes, Absolute 12/09/2024 0.75  0.10 - 0.90 10*3/mm3 Final    Eosinophils, Absolute 12/09/2024 0.07  0.00 - 0.40 10*3/mm3 Final    Basophils, Absolute 12/09/2024 0.06  0.00 - 0.20 10*3/mm3 Final    Immature Grans, Absolute 12/09/2024 0.40 (H)  0.00 - 0.05 10*3/mm3 Final    nRBC 12/09/2024 0.0  0.0 - 0.2 /100 WBC Final    ADENOVIRUS, PCR 12/09/2024 Not Detected  Not Detected Final    Coronavirus 229E 12/09/2024 Not Detected  Not Detected Final    Coronavirus HKU1 12/09/2024 Not Detected  Not Detected Final    Coronavirus NL63 12/09/2024 Not Detected  Not Detected Final    Coronavirus OC43 12/09/2024 Not Detected  Not Detected Final    COVID19 12/09/2024 Not Detected  Not Detected - Ref. Range Final    Human Metapneumovirus 12/09/2024 Not Detected  Not Detected Final    Human  Rhinovirus/Enterovirus 2024 Detected (A)  Not Detected Final    Influenza A PCR 2024 Not Detected  Not Detected Final    Influenza B PCR 2024 Not Detected  Not Detected Final    Parainfluenza Virus 1 2024 Not Detected  Not Detected Final    Parainfluenza Virus 2 2024 Not Detected  Not Detected Final    Parainfluenza Virus 3 2024 Not Detected  Not Detected Final    Parainfluenza Virus 4 2024 Not Detected  Not Detected Final    RSV, PCR 2024 Not Detected  Not Detected Final    Bordetella pertussis pcr 2024 Not Detected  Not Detected Final    Bordetella parapertussis PCR 2024 Not Detected  Not Detected Final    Chlamydophila pneumoniae PCR 2024 Not Detected  Not Detected Final    Mycoplasma pneumo by PCR 2024 Not Detected  Not Detected Final    ABO Type 2024 B   Final    RH type 2024 Positive   Final    Antibody Screen 2024 Negative   Final    T&S Expiration Date 2024 11:59:59 PM   Final    Treponemal AB Total 2024 Non-Reactive  Non-Reactive Final     Component  Ref Range & Units    Strep Gp B KRISTAN  Negative Negative     Ultrasound (24):  Comparisons previous obstetric ultrasound. Today study shows cephalic presentation with normal amniotic fluid volume. Fetal heart rate was 167. Fetal growth today was at 60th percentile for 36 weeks 1 day and abdominal circumference was 91st percentile. Biophysical profile was 8/8.     NST:   150/reactive/moderate variability/no significant decels  Tocometry: Contractions every 2-3 minutes    Assessment & Plan   Assessment / Plan     Brief Patient Summary:  Camille Alford is a 29 y.o. female  3 para 1-0-1-1 at 30-0/7 weeks gestational age with contractions without cervical change  2.  Rhinovirus infection  3.  History of rapid labors  4.  Reassuring fetal status    Active Hospital Problems:  Active Hospital Problems    Diagnosis     **Maternal viral disease  affecting pregnancy in third trimester     Rhinovirus infection      Plan:   1.  Patient is having regular and painful contractions but is not experiencing any cervical change.  Recommend admission overnight for observation and therapeutic rest.  If make cervical change over the night will admit for active labor.  If she does not make any cervical change by the morning would consider for discharge home.  2.  Supportive/symptomatic care tonight for rhinovirus symptoms.  We will start antiemetics for nausea, antidiarrheals as needed.  Continue IV fluids.  Tylenol as needed for fevers and aches and pains.  3.  Plan of care discussed with the patient at length who verbalized understanding.    VTE Prophylaxis:  SCDs        CODE STATUS: Full code     Admission Status:  I believe this patient meets observation status.    Liborio Gordon MD

## 2024-12-10 NOTE — NON STRESS TEST
Camille Alford, a  at 38w1d with an MONO of 2024, by Ultrasound, was seen at [facility] for a nonstress test.    Chief Complaint   Patient presents with    Laboring     Patient states she has had contractions possibly since last night but has really started feeling them since 0830. Pink tinge discharge, +Fm, -LF       Patient Active Problem List   Diagnosis    Rhinovirus infection    Maternal viral disease affecting pregnancy in third trimester    False labor after 37 weeks of gestation without delivery       Start Time: 730  Stop Time: 758    Interpretation A  Nonstress Test Interpretation A: Reactive

## 2024-12-11 ENCOUNTER — ROUTINE PRENATAL (OUTPATIENT)
Dept: OBSTETRICS AND GYNECOLOGY | Facility: CLINIC | Age: 29
End: 2024-12-11
Payer: COMMERCIAL

## 2024-12-11 VITALS — BODY MASS INDEX: 25.87 KG/M2 | WEIGHT: 165.2 LBS | DIASTOLIC BLOOD PRESSURE: 81 MMHG | SYSTOLIC BLOOD PRESSURE: 122 MMHG

## 2024-12-11 DIAGNOSIS — Z34.83 PRENATAL CARE, SUBSEQUENT PREGNANCY IN THIRD TRIMESTER: ICD-10-CM

## 2024-12-11 DIAGNOSIS — Z3A.38 38 WEEKS GESTATION OF PREGNANCY: Primary | ICD-10-CM

## 2024-12-11 DIAGNOSIS — O98.513: ICD-10-CM

## 2024-12-11 LAB
BACTERIA SPEC AEROBE CULT: NO GROWTH
GLUCOSE UR STRIP-MCNC: NEGATIVE MG/DL
PROT UR STRIP-MCNC: ABNORMAL MG/DL

## 2024-12-18 ENCOUNTER — ROUTINE PRENATAL (OUTPATIENT)
Dept: OBSTETRICS AND GYNECOLOGY | Facility: CLINIC | Age: 29
End: 2024-12-18
Payer: COMMERCIAL

## 2024-12-18 VITALS — DIASTOLIC BLOOD PRESSURE: 83 MMHG | SYSTOLIC BLOOD PRESSURE: 142 MMHG | BODY MASS INDEX: 25.37 KG/M2 | WEIGHT: 162 LBS

## 2024-12-18 DIAGNOSIS — Z3A.39 39 WEEKS GESTATION OF PREGNANCY: Primary | ICD-10-CM

## 2024-12-18 DIAGNOSIS — Z34.83 PRENATAL CARE, SUBSEQUENT PREGNANCY IN THIRD TRIMESTER: ICD-10-CM

## 2024-12-18 LAB
GLUCOSE UR STRIP-MCNC: NEGATIVE MG/DL
PROT UR STRIP-MCNC: ABNORMAL MG/DL

## 2024-12-18 NOTE — PROGRESS NOTES
OB follow up     Camille Alford is a 29 y.o.  39w2d being seen today for her obstetrical visit.  Patient reports no complaints. Fetal movement: normal.    Her prenatal care is complicated by (and status): Nothing    Review of Systems  Cramping/contractions : Occasional  Vaginal bleeding: Negative  Fetal movement normal    /83   Wt 73.5 kg (162 lb)   LMP 2024 (Approximate)   BMI 25.37 kg/m²     FHT: 146 BPM   Uterine Size: size equals dates       Assessment    Diagnoses and all orders for this visit:    1. 39 weeks gestation of pregnancy (Primary)  -     POC Urinalysis Dipstick    2. Prenatal care, subsequent pregnancy in third trimester        1) pregnancy at 39w2d         Plan    Reviewed this stage of pregnancy  Problem list updated   Follow up on 2024 for Cytotec ripening and induction of labor.    Song Norwood MD   2024  09:29 EST

## 2024-12-19 ENCOUNTER — TELEPHONE (OUTPATIENT)
Dept: OBSTETRICS AND GYNECOLOGY | Facility: CLINIC | Age: 29
End: 2024-12-19
Payer: COMMERCIAL

## 2024-12-23 ENCOUNTER — HOSPITAL ENCOUNTER (INPATIENT)
Facility: HOSPITAL | Age: 29
LOS: 2 days | Discharge: HOME | End: 2024-12-25
Attending: OBSTETRICS & GYNECOLOGY | Admitting: OBSTETRICS & GYNECOLOGY
Payer: COMMERCIAL

## 2024-12-23 ENCOUNTER — ANESTHESIA (OUTPATIENT)
Dept: LABOR AND DELIVERY | Facility: HOSPITAL | Age: 29
End: 2024-12-23
Payer: COMMERCIAL

## 2024-12-23 ENCOUNTER — TELEPHONE (OUTPATIENT)
Dept: OBSTETRICS AND GYNECOLOGY | Facility: CLINIC | Age: 29
End: 2024-12-23
Payer: COMMERCIAL

## 2024-12-23 ENCOUNTER — ANESTHESIA EVENT (OUTPATIENT)
Dept: LABOR AND DELIVERY | Facility: HOSPITAL | Age: 29
End: 2024-12-23
Payer: COMMERCIAL

## 2024-12-23 PROBLEM — Z34.90 PREGNANCY: Status: ACTIVE | Noted: 2024-12-23

## 2024-12-23 LAB
A1 MICROGLOB PLACENTAL VAG QL: POSITIVE
ABO GROUP BLD: NORMAL
ALBUMIN SERPL-MCNC: 3.5 G/DL (ref 3.5–5.2)
ALBUMIN/GLOB SERPL: 0.9 G/DL
ALP SERPL-CCNC: 186 U/L (ref 39–117)
ALT SERPL W P-5'-P-CCNC: 12 U/L (ref 1–33)
ANION GAP SERPL CALCULATED.3IONS-SCNC: 10.8 MMOL/L (ref 5–15)
AST SERPL-CCNC: 16 U/L (ref 1–32)
BACTERIA UR QL AUTO: ABNORMAL /HPF
BASOPHILS # BLD AUTO: 0.06 10*3/MM3 (ref 0–0.2)
BASOPHILS NFR BLD AUTO: 0.4 % (ref 0–1.5)
BILIRUB SERPL-MCNC: 0.2 MG/DL (ref 0–1.2)
BILIRUB UR QL STRIP: NEGATIVE
BLD GP AB SCN SERPL QL: NEGATIVE
BUN SERPL-MCNC: 7 MG/DL (ref 6–20)
BUN/CREAT SERPL: 11.9 (ref 7–25)
CALCIUM SPEC-SCNC: 9.5 MG/DL (ref 8.6–10.5)
CHLORIDE SERPL-SCNC: 99 MMOL/L (ref 98–107)
CLARITY UR: CLEAR
CO2 SERPL-SCNC: 22.2 MMOL/L (ref 22–29)
COLOR UR: YELLOW
CREAT SERPL-MCNC: 0.59 MG/DL (ref 0.57–1)
DEPRECATED RDW RBC AUTO: 37 FL (ref 37–54)
EGFRCR SERPLBLD CKD-EPI 2021: 124.5 ML/MIN/1.73
EOSINOPHIL # BLD AUTO: 0.08 10*3/MM3 (ref 0–0.4)
EOSINOPHIL NFR BLD AUTO: 0.5 % (ref 0.3–6.2)
ERYTHROCYTE [DISTWIDTH] IN BLOOD BY AUTOMATED COUNT: 12 % (ref 12.3–15.4)
GLOBULIN UR ELPH-MCNC: 3.9 GM/DL
GLUCOSE SERPL-MCNC: 93 MG/DL (ref 65–99)
GLUCOSE UR STRIP-MCNC: NEGATIVE MG/DL
HCT VFR BLD AUTO: 37.4 % (ref 34–46.6)
HGB BLD-MCNC: 13 G/DL (ref 12–15.9)
HGB UR QL STRIP.AUTO: ABNORMAL
HYALINE CASTS UR QL AUTO: ABNORMAL /LPF
IMM GRANULOCYTES # BLD AUTO: 0.36 10*3/MM3 (ref 0–0.05)
IMM GRANULOCYTES NFR BLD AUTO: 2.2 % (ref 0–0.5)
KETONES UR QL STRIP: NEGATIVE
LEUKOCYTE ESTERASE UR QL STRIP.AUTO: ABNORMAL
LYMPHOCYTES # BLD AUTO: 2.25 10*3/MM3 (ref 0.7–3.1)
LYMPHOCYTES NFR BLD AUTO: 13.9 % (ref 19.6–45.3)
MCH RBC QN AUTO: 29.6 PG (ref 26.6–33)
MCHC RBC AUTO-ENTMCNC: 34.8 G/DL (ref 31.5–35.7)
MCV RBC AUTO: 85.2 FL (ref 79–97)
MONOCYTES # BLD AUTO: 0.83 10*3/MM3 (ref 0.1–0.9)
MONOCYTES NFR BLD AUTO: 5.1 % (ref 5–12)
NEUTROPHILS NFR BLD AUTO: 12.57 10*3/MM3 (ref 1.7–7)
NEUTROPHILS NFR BLD AUTO: 77.9 % (ref 42.7–76)
NITRITE UR QL STRIP: NEGATIVE
NRBC BLD AUTO-RTO: 0 /100 WBC (ref 0–0.2)
PH UR STRIP.AUTO: 7 [PH] (ref 5–8)
PLATELET # BLD AUTO: 396 10*3/MM3 (ref 140–450)
PMV BLD AUTO: 10 FL (ref 6–12)
POTASSIUM SERPL-SCNC: 4.2 MMOL/L (ref 3.5–5.2)
PROT SERPL-MCNC: 7.4 G/DL (ref 6–8.5)
PROT UR QL STRIP: NEGATIVE
RBC # BLD AUTO: 4.39 10*6/MM3 (ref 3.77–5.28)
RBC # UR STRIP: ABNORMAL /HPF
REF LAB TEST METHOD: ABNORMAL
RH BLD: POSITIVE
SODIUM SERPL-SCNC: 132 MMOL/L (ref 136–145)
SP GR UR STRIP: <=1.005 (ref 1–1.03)
SQUAMOUS #/AREA URNS HPF: ABNORMAL /HPF
T&S EXPIRATION DATE: NORMAL
TREPONEMA PALLIDUM IGG+IGM AB [PRESENCE] IN SERUM OR PLASMA BY IMMUNOASSAY: NORMAL
UROBILINOGEN UR QL STRIP: ABNORMAL
WBC # UR STRIP: ABNORMAL /HPF
WBC NRBC COR # BLD AUTO: 16.15 10*3/MM3 (ref 3.4–10.8)

## 2024-12-23 PROCEDURE — 99202 OFFICE O/P NEW SF 15 MIN: CPT | Performed by: OBSTETRICS & GYNECOLOGY

## 2024-12-23 PROCEDURE — 25010000002 OXYTOCIN PER 10 UNITS: Performed by: OBSTETRICS & GYNECOLOGY

## 2024-12-23 PROCEDURE — 86901 BLOOD TYPING SEROLOGIC RH(D): CPT | Performed by: OBSTETRICS & GYNECOLOGY

## 2024-12-23 PROCEDURE — 84112 EVAL AMNIOTIC FLUID PROTEIN: CPT | Performed by: OBSTETRICS & GYNECOLOGY

## 2024-12-23 PROCEDURE — 87086 URINE CULTURE/COLONY COUNT: CPT | Performed by: OBSTETRICS & GYNECOLOGY

## 2024-12-23 PROCEDURE — 0KQM0ZZ REPAIR PERINEUM MUSCLE, OPEN APPROACH: ICD-10-PCS | Performed by: OBSTETRICS & GYNECOLOGY

## 2024-12-23 PROCEDURE — 25010000002 ROPIVACAINE PER 1 MG: Performed by: ANESTHESIOLOGY

## 2024-12-23 PROCEDURE — 25010000002 LIDOCAINE-EPINEPHRINE (PF) 1 %-1:200000 SOLUTION: Performed by: ANESTHESIOLOGY

## 2024-12-23 PROCEDURE — 25810000003 LACTATED RINGERS PER 1000 ML: Performed by: OBSTETRICS & GYNECOLOGY

## 2024-12-23 PROCEDURE — 25810000003 SODIUM CHLORIDE 0.9 % SOLUTION: Performed by: OBSTETRICS & GYNECOLOGY

## 2024-12-23 PROCEDURE — 80053 COMPREHEN METABOLIC PANEL: CPT | Performed by: OBSTETRICS & GYNECOLOGY

## 2024-12-23 PROCEDURE — C1755 CATHETER, INTRASPINAL: HCPCS

## 2024-12-23 PROCEDURE — 86900 BLOOD TYPING SEROLOGIC ABO: CPT | Performed by: OBSTETRICS & GYNECOLOGY

## 2024-12-23 PROCEDURE — 25010000002 ONDANSETRON PER 1 MG: Performed by: OBSTETRICS & GYNECOLOGY

## 2024-12-23 PROCEDURE — 81001 URINALYSIS AUTO W/SCOPE: CPT | Performed by: OBSTETRICS & GYNECOLOGY

## 2024-12-23 PROCEDURE — 86850 RBC ANTIBODY SCREEN: CPT | Performed by: OBSTETRICS & GYNECOLOGY

## 2024-12-23 PROCEDURE — 85025 COMPLETE CBC W/AUTO DIFF WBC: CPT | Performed by: OBSTETRICS & GYNECOLOGY

## 2024-12-23 PROCEDURE — C1755 CATHETER, INTRASPINAL: HCPCS | Performed by: ANESTHESIOLOGY

## 2024-12-23 PROCEDURE — 86780 TREPONEMA PALLIDUM: CPT | Performed by: OBSTETRICS & GYNECOLOGY

## 2024-12-23 PROCEDURE — 3E033VJ INTRODUCTION OF OTHER HORMONE INTO PERIPHERAL VEIN, PERCUTANEOUS APPROACH: ICD-10-PCS | Performed by: OBSTETRICS & GYNECOLOGY

## 2024-12-23 RX ORDER — FAMOTIDINE 20 MG/1
20 TABLET, FILM COATED ORAL 2 TIMES DAILY PRN
Status: DISCONTINUED | OUTPATIENT
Start: 2024-12-23 | End: 2024-12-23 | Stop reason: HOSPADM

## 2024-12-23 RX ORDER — ONDANSETRON 2 MG/ML
4 INJECTION INTRAMUSCULAR; INTRAVENOUS ONCE AS NEEDED
Status: DISCONTINUED | OUTPATIENT
Start: 2024-12-23 | End: 2024-12-23 | Stop reason: HOSPADM

## 2024-12-23 RX ORDER — SODIUM CHLORIDE 0.9 % (FLUSH) 0.9 %
1-10 SYRINGE (ML) INJECTION AS NEEDED
Status: DISCONTINUED | OUTPATIENT
Start: 2024-12-23 | End: 2024-12-25 | Stop reason: HOSPADM

## 2024-12-23 RX ORDER — ONDANSETRON 4 MG/1
4 TABLET, ORALLY DISINTEGRATING ORAL EVERY 6 HOURS PRN
Status: DISCONTINUED | OUTPATIENT
Start: 2024-12-23 | End: 2024-12-25 | Stop reason: HOSPADM

## 2024-12-23 RX ORDER — OXYTOCIN/0.9 % SODIUM CHLORIDE 30/500 ML
125 PLASTIC BAG, INJECTION (ML) INTRAVENOUS ONCE AS NEEDED
Status: COMPLETED | OUTPATIENT
Start: 2024-12-23 | End: 2024-12-23

## 2024-12-23 RX ORDER — ONDANSETRON 2 MG/ML
4 INJECTION INTRAMUSCULAR; INTRAVENOUS EVERY 6 HOURS PRN
Status: DISCONTINUED | OUTPATIENT
Start: 2024-12-23 | End: 2024-12-25 | Stop reason: HOSPADM

## 2024-12-23 RX ORDER — EPHEDRINE SULFATE 50 MG/ML
5 INJECTION, SOLUTION INTRAVENOUS
Status: DISCONTINUED | OUTPATIENT
Start: 2024-12-23 | End: 2024-12-23 | Stop reason: HOSPADM

## 2024-12-23 RX ORDER — ROPIVACAINE HYDROCHLORIDE 2 MG/ML
INJECTION, SOLUTION EPIDURAL; INFILTRATION; PERINEURAL AS NEEDED
Status: DISCONTINUED | OUTPATIENT
Start: 2024-12-23 | End: 2024-12-23 | Stop reason: SURG

## 2024-12-23 RX ORDER — SODIUM CHLORIDE 0.9 % (FLUSH) 0.9 %
10 SYRINGE (ML) INJECTION AS NEEDED
Status: DISCONTINUED | OUTPATIENT
Start: 2024-12-23 | End: 2024-12-23 | Stop reason: HOSPADM

## 2024-12-23 RX ORDER — FAMOTIDINE 10 MG/ML
20 INJECTION, SOLUTION INTRAVENOUS 2 TIMES DAILY PRN
Status: DISCONTINUED | OUTPATIENT
Start: 2024-12-23 | End: 2024-12-23 | Stop reason: HOSPADM

## 2024-12-23 RX ORDER — SODIUM CHLORIDE, SODIUM LACTATE, POTASSIUM CHLORIDE, CALCIUM CHLORIDE 600; 310; 30; 20 MG/100ML; MG/100ML; MG/100ML; MG/100ML
125 INJECTION, SOLUTION INTRAVENOUS CONTINUOUS
Status: ACTIVE | OUTPATIENT
Start: 2024-12-23 | End: 2024-12-25

## 2024-12-23 RX ORDER — TERBUTALINE SULFATE 1 MG/ML
0.25 INJECTION, SOLUTION SUBCUTANEOUS AS NEEDED
Status: DISCONTINUED | OUTPATIENT
Start: 2024-12-23 | End: 2024-12-23 | Stop reason: HOSPADM

## 2024-12-23 RX ORDER — ACETAMINOPHEN 325 MG/1
650 TABLET ORAL EVERY 6 HOURS PRN
Status: DISCONTINUED | OUTPATIENT
Start: 2024-12-23 | End: 2024-12-25 | Stop reason: HOSPADM

## 2024-12-23 RX ORDER — METHYLERGONOVINE MALEATE 0.2 MG/ML
200 INJECTION INTRAVENOUS ONCE AS NEEDED
Status: DISCONTINUED | OUTPATIENT
Start: 2024-12-23 | End: 2024-12-23 | Stop reason: HOSPADM

## 2024-12-23 RX ORDER — DIPHENHYDRAMINE HCL 25 MG
25 CAPSULE ORAL NIGHTLY PRN
Status: DISCONTINUED | OUTPATIENT
Start: 2024-12-23 | End: 2024-12-25 | Stop reason: HOSPADM

## 2024-12-23 RX ORDER — ONDANSETRON 2 MG/ML
4 INJECTION INTRAMUSCULAR; INTRAVENOUS EVERY 6 HOURS PRN
Status: DISCONTINUED | OUTPATIENT
Start: 2024-12-23 | End: 2024-12-23 | Stop reason: HOSPADM

## 2024-12-23 RX ORDER — ACETAMINOPHEN 325 MG/1
650 TABLET ORAL EVERY 4 HOURS PRN
Status: DISCONTINUED | OUTPATIENT
Start: 2024-12-23 | End: 2024-12-23 | Stop reason: HOSPADM

## 2024-12-23 RX ORDER — ONDANSETRON 4 MG/1
4 TABLET, ORALLY DISINTEGRATING ORAL EVERY 6 HOURS PRN
Status: DISCONTINUED | OUTPATIENT
Start: 2024-12-23 | End: 2024-12-23 | Stop reason: HOSPADM

## 2024-12-23 RX ORDER — FAMOTIDINE 10 MG/ML
20 INJECTION, SOLUTION INTRAVENOUS ONCE AS NEEDED
Status: DISCONTINUED | OUTPATIENT
Start: 2024-12-23 | End: 2024-12-23 | Stop reason: HOSPADM

## 2024-12-23 RX ORDER — FENTANYL/ROPIVACAINE/NS/PF 2MCG/ML-.2
10 PLASTIC BAG, INJECTION (ML) INJECTION CONTINUOUS
Status: DISCONTINUED | OUTPATIENT
Start: 2024-12-23 | End: 2024-12-25 | Stop reason: HOSPADM

## 2024-12-23 RX ORDER — BUTORPHANOL TARTRATE 2 MG/ML
2 INJECTION, SOLUTION INTRAMUSCULAR; INTRAVENOUS
Status: DISCONTINUED | OUTPATIENT
Start: 2024-12-23 | End: 2024-12-23 | Stop reason: HOSPADM

## 2024-12-23 RX ORDER — MAGNESIUM CARB/ALUMINUM HYDROX 105-160MG
30 TABLET,CHEWABLE ORAL ONCE AS NEEDED
Status: DISCONTINUED | OUTPATIENT
Start: 2024-12-23 | End: 2024-12-23 | Stop reason: HOSPADM

## 2024-12-23 RX ORDER — DIPHENHYDRAMINE HYDROCHLORIDE 50 MG/ML
12.5 INJECTION INTRAMUSCULAR; INTRAVENOUS EVERY 8 HOURS PRN
Status: DISCONTINUED | OUTPATIENT
Start: 2024-12-23 | End: 2024-12-23 | Stop reason: HOSPADM

## 2024-12-23 RX ORDER — HYDROCODONE BITARTRATE AND ACETAMINOPHEN 5; 325 MG/1; MG/1
1 TABLET ORAL EVERY 4 HOURS PRN
Status: DISCONTINUED | OUTPATIENT
Start: 2024-12-23 | End: 2024-12-25 | Stop reason: HOSPADM

## 2024-12-23 RX ORDER — TRANEXAMIC ACID 10 MG/ML
1000 INJECTION, SOLUTION INTRAVENOUS ONCE AS NEEDED
Status: DISCONTINUED | OUTPATIENT
Start: 2024-12-23 | End: 2024-12-25 | Stop reason: HOSPADM

## 2024-12-23 RX ORDER — OXYTOCIN/0.9 % SODIUM CHLORIDE 30/500 ML
999 PLASTIC BAG, INJECTION (ML) INTRAVENOUS ONCE
Status: DISCONTINUED | OUTPATIENT
Start: 2024-12-23 | End: 2024-12-23 | Stop reason: HOSPADM

## 2024-12-23 RX ORDER — OXYTOCIN/0.9 % SODIUM CHLORIDE 30/500 ML
250 PLASTIC BAG, INJECTION (ML) INTRAVENOUS CONTINUOUS
Status: DISPENSED | OUTPATIENT
Start: 2024-12-23 | End: 2024-12-23

## 2024-12-23 RX ORDER — PRENATAL VIT/IRON FUM/FOLIC AC 27MG-0.8MG
1 TABLET ORAL DAILY
Status: DISCONTINUED | OUTPATIENT
Start: 2024-12-24 | End: 2024-12-25 | Stop reason: HOSPADM

## 2024-12-23 RX ORDER — HYDROCODONE BITARTRATE AND ACETAMINOPHEN 10; 325 MG/1; MG/1
1 TABLET ORAL EVERY 4 HOURS PRN
Status: DISCONTINUED | OUTPATIENT
Start: 2024-12-23 | End: 2024-12-25 | Stop reason: HOSPADM

## 2024-12-23 RX ORDER — IBUPROFEN 600 MG/1
600 TABLET, FILM COATED ORAL EVERY 6 HOURS PRN
Status: DISCONTINUED | OUTPATIENT
Start: 2024-12-23 | End: 2024-12-25 | Stop reason: HOSPADM

## 2024-12-23 RX ORDER — HYDROCORTISONE 25 MG/G
1 CREAM TOPICAL AS NEEDED
Status: DISCONTINUED | OUTPATIENT
Start: 2024-12-23 | End: 2024-12-25 | Stop reason: HOSPADM

## 2024-12-23 RX ORDER — MISOPROSTOL 200 UG/1
800 TABLET ORAL ONCE AS NEEDED
Status: DISCONTINUED | OUTPATIENT
Start: 2024-12-23 | End: 2024-12-23 | Stop reason: HOSPADM

## 2024-12-23 RX ORDER — DOCUSATE SODIUM 100 MG/1
100 CAPSULE, LIQUID FILLED ORAL 2 TIMES DAILY
Status: DISCONTINUED | OUTPATIENT
Start: 2024-12-24 | End: 2024-12-25 | Stop reason: HOSPADM

## 2024-12-23 RX ORDER — OXYTOCIN/0.9 % SODIUM CHLORIDE 30/500 ML
2-20 PLASTIC BAG, INJECTION (ML) INTRAVENOUS
Status: DISCONTINUED | OUTPATIENT
Start: 2024-12-23 | End: 2024-12-25 | Stop reason: HOSPADM

## 2024-12-23 RX ORDER — CARBOPROST TROMETHAMINE 250 UG/ML
250 INJECTION, SOLUTION INTRAMUSCULAR
Status: DISCONTINUED | OUTPATIENT
Start: 2024-12-23 | End: 2024-12-23 | Stop reason: HOSPADM

## 2024-12-23 RX ORDER — BISACODYL 10 MG
10 SUPPOSITORY, RECTAL RECTAL DAILY PRN
Status: DISCONTINUED | OUTPATIENT
Start: 2024-12-24 | End: 2024-12-25 | Stop reason: HOSPADM

## 2024-12-23 RX ADMIN — Medication 10 ML/HR: at 16:20

## 2024-12-23 RX ADMIN — SODIUM CHLORIDE, POTASSIUM CHLORIDE, SODIUM LACTATE AND CALCIUM CHLORIDE 125 ML/HR: 600; 310; 30; 20 INJECTION, SOLUTION INTRAVENOUS at 10:47

## 2024-12-23 RX ADMIN — ONDANSETRON 4 MG: 2 INJECTION, SOLUTION INTRAMUSCULAR; INTRAVENOUS at 15:59

## 2024-12-23 RX ADMIN — SODIUM CHLORIDE, POTASSIUM CHLORIDE, SODIUM LACTATE AND CALCIUM CHLORIDE 125 ML/HR: 600; 310; 30; 20 INJECTION, SOLUTION INTRAVENOUS at 14:32

## 2024-12-23 RX ADMIN — IBUPROFEN 600 MG: 600 TABLET, FILM COATED ORAL at 22:00

## 2024-12-23 RX ADMIN — OXYTOCIN 2 MILLI-UNITS/MIN: 10 INJECTION, SOLUTION INTRAMUSCULAR; INTRAVENOUS at 10:56

## 2024-12-23 RX ADMIN — ROPIVACAINE HYDROCHLORIDE 5 ML: 2 INJECTION, SOLUTION EPIDURAL; INFILTRATION at 16:20

## 2024-12-23 RX ADMIN — SODIUM CHLORIDE, POTASSIUM CHLORIDE, SODIUM LACTATE AND CALCIUM CHLORIDE 125 ML/HR: 600; 310; 30; 20 INJECTION, SOLUTION INTRAVENOUS at 18:21

## 2024-12-23 RX ADMIN — Medication 125 ML/HR: at 22:18

## 2024-12-23 RX ADMIN — LIDOCAINE HYDROCHLORIDE 4 ML: 10; .005 INJECTION, SOLUTION EPIDURAL; INFILTRATION; INTRACAUDAL; PERINEURAL at 16:18

## 2024-12-23 NOTE — TELEPHONE ENCOUNTER
If you are having fluid come out that is not stopping, it may be that your water has broken. You will need to go to [facility] to see if your water has broken. It sounds like contractions may have started as well. My Chart message sent.

## 2024-12-23 NOTE — ANESTHESIA PROCEDURE NOTES
Labor Epidural    Pre-sedation assessment completed: 12/23/2024 4:13 PM    Patient reassessed immediately prior to procedure    Patient location during procedure: OB  Start Time: 12/23/2024 4:16 PM  Stop Time: 12/23/2024 4:20 PM  Performed By  Anesthesiologist: Tai Radford DO  Preanesthetic Checklist  Completed: patient identified, IV checked, site marked, risks and benefits discussed, surgical consent, monitors and equipment checked, pre-op evaluation and timeout performed  Prep:  Pt Position:sitting  Sterile Tech:cap, gloves, mask and sterile barrier  Prep:chlorhexidine gluconate and isopropyl alcohol  Monitoring:blood pressure monitoring, continuous pulse oximetry and EKG  Epidural Block Procedure:  Approach:midline  Guidance:landmark technique and palpation technique  Location:L3-L4  Needle Type:Tuohy  Needle Gauge:17 G  Loss of Resistance Medium: air  Loss of Resistance: 5cm  Cath Depth at skin:11 cm  Paresthesia: none  Aspiration:negative  Test Dose:negative  Number of Attempts: 1  Post Assessment:  Dressing:biopatch applied, occlusive dressing applied and secured with tape  Pt Tolerance:patient tolerated the procedure well with no apparent complications  Complications:no

## 2024-12-23 NOTE — TELEPHONE ENCOUNTER
"Provider: DR. FONTANA    Caller: Rocío Camille LAUREN \"LEONEL\"    Relationship to Patient: Self    Pharmacy:     Phone Number: [phone]    Reason for Call: 40WK OB PT - PT HAS A TINY AMOUNT OF FLUID THAT CAME OUT LAST NIGHT WAS LIGHT PINK, STILL OCCURRING RIGHT NOW. HAVING NORMAL VERO BAKER OFF/ON      "

## 2024-12-23 NOTE — ANESTHESIA PREPROCEDURE EVALUATION
Anesthesia Evaluation     Patient summary reviewed   no history of anesthetic complications:                Airway   Mallampati: II  TM distance: >3 FB  Neck ROM: full  No difficulty expected  Dental - normal exam     Pulmonary     breath sounds clear to auscultation  (-) asthma, shortness of breath, recent URI  Cardiovascular   Exercise tolerance: good (4-7 METS)    Rhythm: regular  Rate: normal    (-) past MI, dysrhythmias, angina      Neuro/Psych  (-) seizures, CVA  GI/Hepatic/Renal/Endo    (-)  obesity, liver disease, no renal disease, diabetes    Musculoskeletal     (-) neck stiffness  Abdominal    Substance History      OB/GYN    (+) Pregnant  (-) Preeclampsia        Other        (-) blood dyscrasia (Hb 13.0, Plt 396)              Anesthesia Plan    ASA 2     epidural     (IUP 40w0d)    Anesthetic plan, risks, benefits, and alternatives have been provided, discussed and informed consent has been obtained with: patient.    CODE STATUS:    Level Of Support Discussed With: Patient  Code Status (Patient has no pulse and is not breathing): CPR (Attempt to Resuscitate)  Medical Interventions (Patient has pulse or is breathing): Full Support

## 2024-12-23 NOTE — OBED NOTES
[facility]  Camille Alford  : 1995  MRN: [id number]  CSN: [id number]    OB ED Provider Note    Subjective   Chief Complaint   Patient presents with    Leaking Fluid     SOLIS - pt reports leaking fluid since 2300 last night that is pink tinged. She reports irregular contractions. Good fetal movement.     Camille Alford is a 29 y.o. year old  with an Estimated Date of Delivery: 24 currently at 40w0d presenting with SROM of clear fluid last night at 2300. She has noted some bloody show since. She is noting irregular CTX. FM is present.    Prenatal care has been with Dr. Norwood.  It has been benign.    OB History    Para Term  AB Living   3 1 1 0 1 1   SAB IAB Ectopic Molar Multiple Live Births   1 0 0 0 0 1      # Outcome Date GA Lbr Ryan/2nd Weight Sex Type Anes PTL Lv   3 Current            2 SAB      SAB  N    1 Term    7 g (0.3 oz) F Vag-Spont  N JOSE L      Complications: Third degree laceration of perineum during delivery, postpartum     No past medical history on file.  Past Surgical History:   Procedure Laterality Date    WISDOM TOOTH EXTRACTION         Current Facility-Administered Medications:     acetaminophen (TYLENOL) tablet 650 mg, 650 mg, Oral, Q4H PRN, Song Norwood MD    butorphanol (STADOL) injection 2 mg, 2 mg, Intravenous, Q3H PRN, Song Norwood MD    famotidine (PEPCID) injection 20 mg, 20 mg, Intravenous, BID PRN **OR** famotidine (PEPCID) tablet 20 mg, 20 mg, Oral, BID PRN, Song Norwood MD    lactated ringers bolus 1,000 mL, 1,000 mL, Intravenous, Once PRN, oSng Norwood MD    lactated ringers infusion, 125 mL/hr, Intravenous, Continuous, Song Norwood MD    mineral oil liquid 30 mL, 30 mL, Topical, Once PRN, Song Norwood MD    ondansetron ODT (ZOFRAN-ODT) disintegrating tablet 4 mg, 4 mg, Oral, Q6H PRN **OR** ondansetron (ZOFRAN) injection 4 mg, 4 mg, Intravenous, Q6H PRN, Song Norwood MD    sodium chloride 0.9 % flush 10 mL, 10  "mL, Intravenous, PRN, Song Norwood MD    terbutaline (BRETHINE) injection 0.25 mg, 0.25 mg, Subcutaneous, PRN, Song Norwood MD    No Known Allergies  Social History    Tobacco Use      Smoking status: Never        Passive exposure: Never      Smokeless tobacco: Never    Review of Systems   Genitourinary:  Positive for vaginal discharge.   All other systems reviewed and are negative.        Objective   /83   Pulse 108   Resp 16   Ht 170.2 cm (67\")   Wt 73.8 kg (162 lb 9.6 oz)   LMP 03/13/2024 (Approximate)   SpO2 100%   Breastfeeding Yes   BMI 25.47 kg/m²   General: well developed; well nourished  no acute distress  mentation appropriate   Abdomen: soft, non-tender; no masses  gravid    FHT's: reactive and category 1      Cervix: was checked (by RN): 1.5 cm / 60 % / -3   Presentation: cephalic   Contractions: irregular   Chest: Unlabored respirations    CV:  Mild tachycardia, RR   Ext:   No C/C/E   Back: CVA tenderness is deferred bilateral        Prenatal Labs  Lab Results   Component Value Date    HGB 13.1 12/09/2024    RUBELLAABIGG 4.44 06/26/2024    HEPBSAG Negative 06/26/2024    ABORH B Positive 05/31/2019    ABSCRN Negative 12/09/2024    CFN8ITC0 Non Reactive 06/26/2024    HEPCVIRUSABY Non Reactive 06/26/2024     10/02/2024    STREPGPB Negative 11/26/2024    URINECX No growth 12/09/2024    CHLAMNAA Negative 07/30/2024    NGONORRHON Negative 07/30/2024       Current Labs Reviewed   UA:    Lab Results   Component Value Date    SQUAMEPIUA 13-20 (A) 12/23/2024    SPECGRAVUR <=1.005 12/23/2024    KETONESU Negative 12/23/2024    BLOODU Small (1+) (A) 12/23/2024    LEUKOCYTESUR Trace (A) 12/23/2024    NITRITEU Negative 12/23/2024    RBCUA 0-2 12/23/2024    WBCUA 6-10 (A) 12/23/2024    BACTERIA Trace (A) 12/23/2024     ROM+ positive     Assessment   IUP at 40w0d  PROM- ruptured membranes, not currently laboring. GBS negative     Plan   Admit to L&D for pitocin augmentation. Dr. Norwood " notified and is assuming management.    Jovanny Suh MD  12/23/2024  10:27 EST

## 2024-12-23 NOTE — H&P
[facility]  Obstetric History and Physical    Chief Complaint   Patient presents with    Leaking Fluid     SOLIS - pt reports leaking fluid since 2300 last night that is pink tinged. She reports irregular contractions. Good fetal movement.       Subjective     Patient is a 29 y.o. female  currently at 40w0d, who presents with evidence of spontaneous rupture membranes with no painful contractions she has had normal fetal movement.    Her prenatal care is benign.  Her previous obstetric/gynecological history is noted for is non-contributory.    The following portions of the patients history were reviewed and updated as appropriate: current medications, allergies, past medical history, past surgical history, past family history, past social history, and problem list .       Prenatal Information:  Prenatal Results       Initial Prenatal Labs       Test Value Reference Range Date Time    Hemoglobin  12.4 g/dL 11.1 - 15.9 24 1429       13.0 g/dL 12.0 - 15.9 24 1139    Hematocrit  36.1 % 34.0 - 46.6 24 1429       38.5 % 34.0 - 46.6 24 1139    Platelets  286 x10E3/uL 150 - 450 24 1429       265 10*3/mm3 140 - 450 24 1139    Rubella IgG  4.44 index Immune >0.99 24 1429    Hepatitis B SAg  Negative  Negative 24 1429    Hepatitis C Ab  Non Reactive  Non Reactive 24 1429    RPR  Non Reactive  Non Reactive 24 1429    T. Pallidum Ab   Non-Reactive  Non-Reactive 24 1031       Non-Reactive  Non-Reactive 24 1727       Non Reactive  Non Reactive 10/02/24 1037    ABO  B   24 1031    Rh  Positive   24 1031    Antibody Screen  Negative  Negative 24 1429    HIV  Non Reactive  Non Reactive 24 1429    Urine Culture  No growth   24 1315       Final report   24 1157       Final report   24 1318    Gonorrhea  Negative  Negative 24 1157       Negative  Negative 24 1318    Chlamydia  Negative  Negative 24  1157       Negative  Negative 04/23/24 1318    TSH        HgB A1c         Varicella IgG        Hemoglobinopathy Fractionation        Hemoglobinopathy (genetic testing)        Cystic fibrosis         Spinal muscular atrophy        Fragile X                  Fetal testing        Test Value Reference Range Date Time    NIPT        MSAFP        AFP-4                  2nd and 3rd Trimester       Test Value Reference Range Date Time    Hemoglobin (repeated)  13.0 g/dL 12.0 - 15.9 12/23/24 1031       13.1 g/dL 12.0 - 15.9 12/09/24 1628       12.2 g/dL 11.1 - 15.9 10/02/24 1037    Hematocrit (repeated)  37.4 % 34.0 - 46.6 12/23/24 1031       37.8 % 34.0 - 46.6 12/09/24 1628       38.1 % 34.0 - 46.6 10/02/24 1037    Platelets   396 10*3/mm3 140 - 450 12/23/24 1031       337 10*3/mm3 140 - 450 12/09/24 1628       286 x10E3/uL 150 - 450 06/26/24 1429       265 10*3/mm3 140 - 450 06/02/24 1139    1 hour GTT   106 mg/dL 70 - 139 10/02/24 1037    Antibody Screen (repeated)  Negative   12/23/24 1031       Negative   12/09/24 1727    3rd TM syphilis scrn (repeated)  RPR         3rd TM syphilis scrn (repeated) TP-Ab  Non-Reactive  Non-Reactive 12/23/24 1031       Non-Reactive  Non-Reactive 12/09/24 1727       Non Reactive  Non Reactive 10/02/24 1037    3rd TM syphilis screen TB-Ab (FTA)  Non-Reactive  Non-Reactive 12/23/24 1031       Non-Reactive  Non-Reactive 12/09/24 1727       Non Reactive  Non Reactive 10/02/24 1037    Syphilis cascade test TP-Ab (EIA)        Syphilis cascade TPPA        GTT Fasting        GTT 1 Hr        GTT 2 Hr        GTT 3 Hr        Group B Strep  Negative  Negative 11/26/24 1602              Other testing        Test Value Reference Range Date Time    Parvo IgG         CMV IgG   <0.60 U/mL 0.00 - 0.59 06/26/24 1429              Drug Screening       Test Value Reference Range Date Time    Amphetamine Screen  Negative ng/mL Kputmg=7305 04/23/24 1318    Barbiturate Screen  Negative ng/mL Kfrahj=782 04/23/24  1318    Benzodiazepine Screen  Negative ng/mL Nirenh=997 04/23/24 1318    Methadone Screen  Negative ng/mL Xzbnuf=014 04/23/24 1318    Phencyclidine Screen  Negative ng/mL Cutoff=25 04/23/24 1318    Opiates Screen  Negative ng/mL Nxvpsp=140 04/23/24 1318    THC Screen  Negative ng/mL Cutoff=50 04/23/24 1318    Cocaine Screen  Negative ng/mL Jntbuo=224 04/23/24 1318    Propoxyphene Screen  Negative ng/mL Peclto=229 04/23/24 1318    Buprenorphine Screen        Methamphetamine Screen        Oxycodone Screen        Tricyclic Antidepressants Screen                  Legend    ^: Historical                          External Prenatal Results       Pregnancy Outside Results - Transcribed From Office Records - See Scanned Records For Details       Test Value Date Time    ABO  B  12/23/24 1031    Rh  Positive  12/23/24 1031    Antibody Screen  Negative  12/23/24 1031       Negative  12/09/24 1727       Negative  06/26/24 1429    Varicella IgG       Rubella  4.44 index 06/26/24 1429    Hgb  13.0 g/dL 12/23/24 1031       13.1 g/dL 12/09/24 1628       12.2 g/dL 10/02/24 1037       12.4 g/dL 06/26/24 1429       13.0 g/dL 06/02/24 1139    Hct  37.4 % 12/23/24 1031       37.8 % 12/09/24 1628       38.1 % 10/02/24 1037       36.1 % 06/26/24 1429       38.5 % 06/02/24 1139    HgB A1c        1h GTT  106 mg/dL 10/02/24 1037    3h GTT Fasting       3h GTT 1 hour       3h GTT 2 hour       3h GTT 3 hour        Gonorrhea (discrete)  Negative  07/30/24 1157       Negative  04/23/24 1318    Chlamydia (discrete)  Negative  07/30/24 1157       Negative  04/23/24 1318    RPR  Non Reactive  06/26/24 1429    Syphils cascade: TP-Ab (FTA)  Non-Reactive  12/23/24 1031       Non-Reactive  12/09/24 1727       Non Reactive  10/02/24 1037    TP-Ab  Non-Reactive  12/23/24 1031       Non-Reactive  12/09/24 1727       Non Reactive  10/02/24 1037    TP-Ab (EIA)       TPPA       HBsAg  Negative  06/26/24 1429    Herpes Simplex Virus PCR       Herpes Simplex  VIrus Culture       HIV  Non Reactive  24 1429    Hep C RNA Quant PCR       Hep C Antibody  Non Reactive  24 1429    AFP       NIPT       Cystic Fibrosis (John)       Cystic Fibroisis        Spinal Muscular atrophy       Fragile X       Group B Strep  Negative  24 1602    GBS Susceptibility to Clindamycin       GBS Susceptibility to Erythromycin       Fetal Fibronectin       Genetic Testing, Maternal Blood                 Drug Screening       Test Value Date Time    Urine Drug Screen       Amphetamine Screen  Negative ng/mL 24 1318    Barbiturate Screen  Negative ng/mL 24 1318    Benzodiazepine Screen  Negative ng/mL 24 1318    Methadone Screen  Negative ng/mL 24 1318    Phencyclidine Screen  Negative ng/mL 24 1318    Opiates Screen       THC Screen       Cocaine Screen       Propoxyphene Screen  Negative ng/mL 24 1318    Buprenorphine Screen       Methamphetamine Screen       Oxycodone Screen       Tricyclic Antidepressants Screen                 Legend    ^: Historical                             Past OB History:     OB History    Para Term  AB Living   3 1 1 0 1 1   SAB IAB Ectopic Molar Multiple Live Births   1 0 0 0 0 1      # Outcome Date GA Lbr Ryan/2nd Weight Sex Type Anes PTL Lv   3 Current            2 SAB      SAB  N    1 Term    7 g (0.3 oz) F Vag-Spont  N JOSE L      Complications: Third degree laceration of perineum during delivery, postpartum       Past Medical History: No past medical history on file.   Past Surgical History Past Surgical History:   Procedure Laterality Date    WISDOM TOOTH EXTRACTION        Family History: No family history on file.   Social History:  reports that she has never smoked. She has never been exposed to tobacco smoke. She has never used smokeless tobacco.   reports that she does not currently use alcohol.   reports no history of drug use.        General ROS: Pertinent items are noted in HPI, all other  systems reviewed and negative    Objective       Vital Signs Range for the last 24 hours  Temperature: Temp:  [98.3 °F (36.8 °C)] 98.3 °F (36.8 °C)   Temp Source: Temp src: Oral   BP: BP: (114-143)/(72-83) 143/75   Pulse: Heart Rate:  [] 114   Respirations: Resp:  [16-18] 18   SPO2: SpO2:  [100 %] 100 %   O2 Amount (l/min):     O2 Devices Device (Oxygen Therapy): room air   Weight: Weight:  [73.8 kg (162 lb 9.6 oz)] 73.8 kg (162 lb 9.6 oz)     Physical Examination: General appearance - alert, well appearing, and in no distress and oriented to person, place, and time  Mental status - alert, oriented to person, place, and time, normal mood, behavior, speech, dress, motor activity, and thought processes  Heart - normal rate, regular rhythm, normal S1, S2, no murmurs, rubs, clicks or gallops  Abdomen - soft, nontender, nondistended, no masses or organomegaly  Gravid with fundal height consistent with 40 weeks status  Extremities - peripheral pulses normal, no pedal edema, no clubbing or cyanosis    Presentation: Cephalic   Cervix: Exam by: Method: sterile vaginal exam performed   Dilation: Cervical Dilation (cm): 1-2   Effacement: Cervical Effacement: 60   Station:         Fetal Heart Rate Assessment   Method: Fetal HR Assessment Method: external   Beats/min: Fetal HR (beats/min): 145   Baseline: Fetal HR Baseline: normal range   Variability: Fetal HR Variability: moderate (amplitude range 6 to 25 bpm)   Accels: Fetal HR Accelerations: greater than/equal to 15 bpm, lasting at least 15 seconds   Decels: Fetal HR Decelerations: absent   Tracing Category:       Uterine Assessment   Method: Method: external tocotransducer, palpation   Frequency (min): Contraction Frequency (Minutes): 2-4   Ctx Count in 10 min: Contractions in 10 Minutes: 3.5   Duration:     Intensity: Contraction Intensity: strong by palpation   Intensity by IUPC:     Resting Tone: Uterine Resting Tone: soft by palpation   Resting Tone by IUPC:      [place] Units:       Laboratory Results: Hemoglobin 13  Radiology Review: .  Other Studies: .    Assessment & Plan       Pregnancy        Assessment:  1.  Intrauterine pregnancy at 40w0d gestation with reactive, reassuring fetal status.    2.  induction of labor  for 40 weeks status with spontaneous rupture membranes  with favorable cervix  3.  Obstetrical history significant for is non-contributory.  4.  GBS status:   Strep Gp B KRISTAN   Date Value Ref Range Status   2024 Negative Negative Final     Comment:     Centers for Disease Control and Prevention (CDC) and American Congress  of Obstetricians and Gynecologists (ACOG) guidelines for prevention of   group B streptococcal (GBS) disease specify co-collection of  a vaginal and rectal swab specimen to maximize sensitivity of GBS  detection. Per the CDC and ACOG, swabbing both the lower vagina and  rectum substantially increases the yield of detection compared with  sampling the vagina alone.  Penicillin G, ampicillin, or cefazolin are indicated for intrapartum  prophylaxis of  GBS colonization. Reflex susceptibility  testing should be performed prior to use of clindamycin only on GBS  isolates from penicillin-allergic women who are considered a high risk  for anaphylaxis. Treatment with vancomycin without additional testing  is warranted if resistance to clindamycin is noted.         Plan:  1. Vaginal anticipated, fetal and uterine monitoring  continuously, labor augmentation  Pitocin, and analgesia with  epidural  2. Plan of care has been reviewed with patient and she agrees  3.  Risks, benefits of treatment plan have been discussed.  4.  All questions have been answered.  5.  .      Song Norwood MD  2024  15:59 EST

## 2024-12-24 LAB
BACTERIA SPEC AEROBE CULT: NO GROWTH
BASOPHILS # BLD AUTO: 0.06 10*3/MM3 (ref 0–0.2)
BASOPHILS NFR BLD AUTO: 0.3 % (ref 0–1.5)
DEPRECATED RDW RBC AUTO: 37.5 FL (ref 37–54)
EOSINOPHIL # BLD AUTO: 0.07 10*3/MM3 (ref 0–0.4)
EOSINOPHIL NFR BLD AUTO: 0.3 % (ref 0.3–6.2)
ERYTHROCYTE [DISTWIDTH] IN BLOOD BY AUTOMATED COUNT: 12 % (ref 12.3–15.4)
HCT VFR BLD AUTO: 29.8 % (ref 34–46.6)
HGB BLD-MCNC: 10.1 G/DL (ref 12–15.9)
IMM GRANULOCYTES # BLD AUTO: 0.35 10*3/MM3 (ref 0–0.05)
IMM GRANULOCYTES NFR BLD AUTO: 1.7 % (ref 0–0.5)
LYMPHOCYTES # BLD AUTO: 2.72 10*3/MM3 (ref 0.7–3.1)
LYMPHOCYTES NFR BLD AUTO: 13.4 % (ref 19.6–45.3)
MCH RBC QN AUTO: 29.2 PG (ref 26.6–33)
MCHC RBC AUTO-ENTMCNC: 33.9 G/DL (ref 31.5–35.7)
MCV RBC AUTO: 86.1 FL (ref 79–97)
MONOCYTES # BLD AUTO: 1.16 10*3/MM3 (ref 0.1–0.9)
MONOCYTES NFR BLD AUTO: 5.7 % (ref 5–12)
NEUTROPHILS NFR BLD AUTO: 15.9 10*3/MM3 (ref 1.7–7)
NEUTROPHILS NFR BLD AUTO: 78.6 % (ref 42.7–76)
NRBC BLD AUTO-RTO: 0 /100 WBC (ref 0–0.2)
PLATELET # BLD AUTO: 302 10*3/MM3 (ref 140–450)
PMV BLD AUTO: 10.1 FL (ref 6–12)
RBC # BLD AUTO: 3.46 10*6/MM3 (ref 3.77–5.28)
WBC NRBC COR # BLD AUTO: 20.26 10*3/MM3 (ref 3.4–10.8)

## 2024-12-24 PROCEDURE — 85025 COMPLETE CBC W/AUTO DIFF WBC: CPT | Performed by: OBSTETRICS & GYNECOLOGY

## 2024-12-24 RX ADMIN — ACETAMINOPHEN 650 MG: 325 TABLET ORAL at 14:27

## 2024-12-24 RX ADMIN — IBUPROFEN 600 MG: 600 TABLET, FILM COATED ORAL at 11:37

## 2024-12-24 RX ADMIN — Medication 1 TABLET: at 08:03

## 2024-12-24 RX ADMIN — DOCUSATE SODIUM 100 MG: 100 CAPSULE, LIQUID FILLED ORAL at 08:03

## 2024-12-24 RX ADMIN — ACETAMINOPHEN 650 MG: 325 TABLET ORAL at 08:03

## 2024-12-24 RX ADMIN — IBUPROFEN 600 MG: 600 TABLET, FILM COATED ORAL at 04:04

## 2024-12-24 RX ADMIN — IBUPROFEN 600 MG: 600 TABLET, FILM COATED ORAL at 18:18

## 2024-12-24 RX ADMIN — DOCUSATE SODIUM 100 MG: 100 CAPSULE, LIQUID FILLED ORAL at 19:55

## 2024-12-24 RX ADMIN — DOCUSATE SODIUM 100 MG: 100 CAPSULE, LIQUID FILLED ORAL at 01:43

## 2024-12-24 RX ADMIN — Medication: at 01:43

## 2024-12-24 RX ADMIN — ACETAMINOPHEN 650 MG: 325 TABLET ORAL at 22:55

## 2024-12-24 RX ADMIN — MUPIROCIN 1 APPLICATION: 20 OINTMENT TOPICAL at 08:03

## 2024-12-24 RX ADMIN — ACETAMINOPHEN 650 MG: 325 TABLET ORAL at 01:43

## 2024-12-24 NOTE — PROGRESS NOTES
"Subjective:  Postpartum Day 1:     The patient feels well.  Pain is well controlled with current medications. The baby is well.  Urinary output is adequate. The patient is ambulating well. The patient is tolerating a normal diet. Flatus has been passed.      Objective:    Vital signs in last 24 hours:  Blood pressure 119/67, pulse 86, temperature 97.2 °F (36.2 °C), temperature source Oral, resp. rate 16, height 170.2 cm (67\"), weight 73.8 kg (162 lb 9.6 oz), last menstrual period 03/13/2024, SpO2 98%, currently breastfeeding.      General:    alert, appears stated age, and cooperative   Uterine Fundus:   firm     Labs    Hgb 13.0 --> 10.1    Female infant / Rh positive    Assessment/Plan:.     Postpartum Day #1  - Patient doing well and making normal postpartum milestones.  Discharge home.      Zaid Talley MD     "

## 2024-12-24 NOTE — PLAN OF CARE
Goal Outcome Evaluation:  Plan of Care Reviewed With: patient        Progress: improving  Outcome Evaluation: vss, pain controlled with motrin and tylenol, bleeding scant. will ctm

## 2024-12-24 NOTE — LACTATION NOTE
This note was copied from a baby's chart.  P2, T baby-new admission from late last night. Mom  is planning on breast and formula feeding. Informed PT that LC is available to help with BF until 2000. Mother reports baby BF well in L&D, but her nipples got cracked and she is now pumping with a hand pump and giving formula. RN already ordered APNO for PT. Encouraged PT to try BF baby every 2-3 h. or PRN and always to offer breast first and then bottle.  Educated on the importance of stimulation for adequate milk supply. Mom has PBP. She denies any questions. Encouraged to call with next feeding to get help with deeper latch.

## 2024-12-24 NOTE — PLAN OF CARE
Goal Outcome Evaluation:      VSS, assessment WNL, voiding sponteously, payal de souza utilized x 2 to transfer pt to bathroom, pt continues to have numbness in LLE, pain controlled w/ medication, pt breast and bottle feeding

## 2024-12-24 NOTE — PAYOR COMM NOTE
"Camille Alford \"LEONEL\" (29 y.o. Female)       Date of Birth   1995    Social Security Number       Address   [address] [place] [address]    Home Phone   [phone]    MRN   [id number]       P[employer]ism   Jehov[employer]'s witness    Marital Status                               Admission Date   12/23/24    Admission Type   Emergency    Admitting Provider   Song Norwood MD    Attending Provider   Song Norwood MD    Department, Room/Bed   [address], [address]/1       Discharge Date       Discharge Disposition       Discharge Destination                                 Attending Provider: Song Norwood MD    Allergies: No Known Allergies    Isolation: None   Infection: None   Code Status: CPR    Ht: 170.2 cm (67\")   Wt: 73.8 kg (162 lb 9.6 oz)    Admission Cmt: None   Principal Problem: None                  Active Insurance as of 12/23/2024       Primary Coverage       Payor Plan Insurance Group Employer/Plan Group    ANTHEM BLUE CROSS ANTHEM BLUE CROSS BLUE SHIELD PPO [id number]       Payor Plan Address Payor Plan Phone Number Payor Plan Fax Number Effective Dates    [address] [phone]  7/1/2024 - None Entered    [facility] [id number]         Subscriber Name Subscriber Birth Date Member ID       NILSONDINESH LOCKWOOD 7/3/1995 [id number]                     Emergency Contacts        (Rel.) Home Phone Work Phone Mobile Phone    VENESSA ALFORD (Spouse) [phone] -- [phone]              Insurance Information                  ANTHEM BLUE CROSS/[employer]HEM BLUE CROSS BLUE SHIELD PPO Phone: [phone]    Subscriber: Dinesh Alford Subscriber#: [id number]    Group#: [id number] Precert#: --    Authorization#: -- Effective Date: --          Problem List             Codes Noted - Resolved       Hospital    Pregnancy ICD-10-CM: Z34.90  ICD-9-CM: V22.2 12/23/2024 - Present       Non-Hospital    False labor after 37 weeks of gestation without delivery ICD-10-CM: " O47.1  ICD-9-CM: 644.10 12/10/2024 - Present    Rhinovirus infection ICD-10-CM: B34.8  ICD-9-CM: 079.3 2024 - Present    Maternal viral disease affecting pregnancy in third trimester ICD-10-CM: O98.513  ICD-9-CM: 647.63, 079.99 2024 - Present        History & Physical        Song Norwood MD at 24 1559          [facility]  Obstetric History and Physical    Chief Complaint   Patient presents with    Leaking Fluid     SOLIS - pt reports leaking fluid since 2300 last night that is pink tinged. She reports irregular contractions. Good fetal movement.       Subjective    Patient is a 29 y.o. female  currently at 40w0d, who presents with evidence of spontaneous rupture membranes with no painful contractions she has had normal fetal movement.    Her prenatal care is benign.  Her previous obstetric/gynecological history is noted for is non-contributory.    The following portions of the patients history were reviewed and updated as appropriate: current medications, allergies, past medical history, past surgical history, past family history, past social history, and problem list .       Prenatal Information:  Prenatal Results       Initial Prenatal Labs       Test Value Reference Range Date Time    Hemoglobin  12.4 g/dL 11.1 - 15.9 24 1429       13.0 g/dL 12.0 - 15.9 24 1139    Hematocrit  36.1 % 34.0 - 46.6 24 1429       38.5 % 34.0 - 46.6 24 1139    Platelets  286 x10E3/uL 150 - 450 24 1429       265 10*3/mm3 140 - 450 24 1139    Rubella IgG  4.44 index Immune >0.99 24 1429    Hepatitis B SAg  Negative  Negative 24 1429    Hepatitis C Ab  Non Reactive  Non Reactive 24 1429    RPR  Non Reactive  Non Reactive 24 1429    T. Pallidum Ab   Non-Reactive  Non-Reactive 24 1031       Non-Reactive  Non-Reactive 24 1727       Non Reactive  Non Reactive 10/02/24 1037    ABO  B   24 1031    Rh  Positive   24 1031    Antibody  Screen  Negative  Negative 06/26/24 1429    HIV  Non Reactive  Non Reactive 06/26/24 1429    Urine Culture  No growth   12/09/24 1315       Final report   07/30/24 1157       Final report   04/23/24 1318    Gonorrhea  Negative  Negative 07/30/24 1157       Negative  Negative 04/23/24 1318    Chlamydia  Negative  Negative 07/30/24 1157       Negative  Negative 04/23/24 1318    TSH        HgB A1c         Varicella IgG        Hemoglobinopathy Fractionation        Hemoglobinopathy (genetic testing)        Cystic fibrosis         Spinal muscular atrophy        Fragile X                  Fetal testing        Test Value Reference Range Date Time    NIPT        MSAFP        AFP-4                  2nd and 3rd Trimester       Test Value Reference Range Date Time    Hemoglobin (repeated)  13.0 g/dL 12.0 - 15.9 12/23/24 1031       13.1 g/dL 12.0 - 15.9 12/09/24 1628       12.2 g/dL 11.1 - 15.9 10/02/24 1037    Hematocrit (repeated)  37.4 % 34.0 - 46.6 12/23/24 1031       37.8 % 34.0 - 46.6 12/09/24 1628       38.1 % 34.0 - 46.6 10/02/24 1037    Platelets   396 10*3/mm3 140 - 450 12/23/24 1031       337 10*3/mm3 140 - 450 12/09/24 1628       286 x10E3/uL 150 - 450 06/26/24 1429       265 10*3/mm3 140 - 450 06/02/24 1139    1 hour GTT   106 mg/dL 70 - 139 10/02/24 1037    Antibody Screen (repeated)  Negative   12/23/24 1031       Negative   12/09/24 1727    3rd TM syphilis scrn (repeated)  RPR         3rd TM syphilis scrn (repeated) TP-Ab  Non-Reactive  Non-Reactive 12/23/24 1031       Non-Reactive  Non-Reactive 12/09/24 1727       Non Reactive  Non Reactive 10/02/24 1037    3rd TM syphilis screen TB-Ab (FTA)  Non-Reactive  Non-Reactive 12/23/24 1031       Non-Reactive  Non-Reactive 12/09/24 1727       Non Reactive  Non Reactive 10/02/24 1037    Syphilis cascade test TP-Ab (EIA)        Syphilis cascade TPPA        GTT Fasting        GTT 1 Hr        GTT 2 Hr        GTT 3 Hr        Group B Strep  Negative  Negative 11/26/24 1602               Other testing        Test Value Reference Range Date Time    Parvo IgG         CMV IgG   <0.60 U/mL 0.00 - 0.59 06/26/24 1429              Drug Screening       Test Value Reference Range Date Time    Amphetamine Screen  Negative ng/mL Xeuhce=3569 04/23/24 1318    Barbiturate Screen  Negative ng/mL Zfehzt=294 04/23/24 1318    Benzodiazepine Screen  Negative ng/mL Hpzxtl=431 04/23/24 1318    Methadone Screen  Negative ng/mL Hqkpux=337 04/23/24 1318    Phencyclidine Screen  Negative ng/mL Cutoff=25 04/23/24 1318    Opiates Screen  Negative ng/mL Sjwkfj=405 04/23/24 1318    THC Screen  Negative ng/mL Cutoff=50 04/23/24 1318    Cocaine Screen  Negative ng/mL Ngrwkh=091 04/23/24 1318    Propoxyphene Screen  Negative ng/mL Fdbypi=221 04/23/24 1318    Buprenorphine Screen        Methamphetamine Screen        Oxycodone Screen        Tricyclic Antidepressants Screen                  Legend    ^: Historical                          External Prenatal Results       Pregnancy Outside Results - Transcribed From Office Records - See Scanned Records For Details       Test Value Date Time    ABO  B  12/23/24 1031    Rh  Positive  12/23/24 1031    Antibody Screen  Negative  12/23/24 1031       Negative  12/09/24 1727       Negative  06/26/24 1429    Varicella IgG       Rubella  4.44 index 06/26/24 1429    Hgb  13.0 g/dL 12/23/24 1031       13.1 g/dL 12/09/24 1628       12.2 g/dL 10/02/24 1037       12.4 g/dL 06/26/24 1429       13.0 g/dL 06/02/24 1139    Hct  37.4 % 12/23/24 1031       37.8 % 12/09/24 1628       38.1 % 10/02/24 1037       36.1 % 06/26/24 1429       38.5 % 06/02/24 1139    HgB A1c        1h GTT  106 mg/dL 10/02/24 1037    3h GTT Fasting       3h GTT 1 hour       3h GTT 2 hour       3h GTT 3 hour        Gonorrhea (discrete)  Negative  07/30/24 1157       Negative  04/23/24 1318    Chlamydia (discrete)  Negative  07/30/24 1157       Negative  04/23/24 1318    RPR  Non Reactive  06/26/24 1429    Syphils  cascade: TP-Ab (FTA)  Non-Reactive  24 1031       Non-Reactive  24 1727       Non Reactive  10/02/24 1037    TP-Ab  Non-Reactive  24 1031       Non-Reactive  24 1727       Non Reactive  10/02/24 1037    TP-Ab (EIA)       TPPA       HBsAg  Negative  24 1429    Herpes Simplex Virus PCR       Herpes Simplex VIrus Culture       HIV  Non Reactive  24 1429    Hep C RNA Quant PCR       Hep C Antibody  Non Reactive  24 1429    AFP       NIPT       Cystic Fibrosis (John)       Cystic Fibroisis        Spinal Muscular atrophy       Fragile X       Group B Strep  Negative  24 1602    GBS Susceptibility to Clindamycin       GBS Susceptibility to Erythromycin       Fetal Fibronectin       Genetic Testing, Maternal Blood                 Drug Screening       Test Value Date Time    Urine Drug Screen       Amphetamine Screen  Negative ng/mL 24 1318    Barbiturate Screen  Negative ng/mL 24 1318    Benzodiazepine Screen  Negative ng/mL 24 1318    Methadone Screen  Negative ng/mL 24 1318    Phencyclidine Screen  Negative ng/mL 24 1318    Opiates Screen       THC Screen       Cocaine Screen       Propoxyphene Screen  Negative ng/mL 24 1318    Buprenorphine Screen       Methamphetamine Screen       Oxycodone Screen       Tricyclic Antidepressants Screen                 Legend    ^: Historical                             Past OB History:     OB History    Para Term  AB Living   3 1 1 0 1 1   SAB IAB Ectopic Molar Multiple Live Births   1 0 0 0 0 1      # Outcome Date GA Lbr Ryan/2nd Weight Sex Type Anes PTL Lv   3 Current            2 SAB      SAB  N    1 Term    7 g (0.3 oz) F Vag-Spont  N JOSE L      Complications: Third degree laceration of perineum during delivery, postpartum       Past Medical History: No past medical history on file.   Past Surgical History Past Surgical History:   Procedure Laterality Date    WISDOM TOOTH EXTRACTION         Family History: No family history on file.   Social History:  reports that she has never smoked. She has never been exposed to tobacco smoke. She has never used smokeless tobacco.   reports that she does not currently use alcohol.   reports no history of drug use.        General ROS: Pertinent items are noted in HPI, all other systems reviewed and negative    Objective      Vital Signs Range for the last 24 hours  Temperature: Temp:  [98.3 °F (36.8 °C)] 98.3 °F (36.8 °C)   Temp Source: Temp src: Oral   BP: BP: (114-143)/(72-83) 143/75   Pulse: Heart Rate:  [] 114   Respirations: Resp:  [16-18] 18   SPO2: SpO2:  [100 %] 100 %   O2 Amount (l/min):     O2 Devices Device (Oxygen Therapy): room air   Weight: Weight:  [73.8 kg (162 lb 9.6 oz)] 73.8 kg (162 lb 9.6 oz)     Physical Examination: General appearance - alert, well appearing, and in no distress and oriented to person, place, and time  Mental status - alert, oriented to person, place, and time, normal mood, behavior, speech, dress, motor activity, and thought processes  Heart - normal rate, regular rhythm, normal S1, S2, no murmurs, rubs, clicks or gallops  Abdomen - soft, nontender, nondistended, no masses or organomegaly  Gravid with fundal height consistent with 40 weeks status  Extremities - peripheral pulses normal, no pedal edema, no clubbing or cyanosis    Presentation: Cephalic   Cervix: Exam by: Method: sterile vaginal exam performed   Dilation: Cervical Dilation (cm): 1-2   Effacement: Cervical Effacement: 60   Station:         Fetal Heart Rate Assessment   Method: Fetal HR Assessment Method: external   Beats/min: Fetal HR (beats/min): 145   Baseline: Fetal HR Baseline: normal range   Variability: Fetal HR Variability: moderate (amplitude range 6 to 25 bpm)   Accels: Fetal HR Accelerations: greater than/equal to 15 bpm, lasting at least 15 seconds   Decels: Fetal HR Decelerations: absent   Tracing Category:       Uterine Assessment   Method:  Method: external tocotransducer, palpation   Frequency (min): Contraction Frequency (Minutes): 2-4   Ctx Count in 10 min: Contractions in 10 Minutes: 3.5   Duration:     Intensity: Contraction Intensity: strong by palpation   Intensity by IUPC:     Resting Tone: Uterine Resting Tone: soft by palpation   Resting Tone by IUPC:     [place] Units:       Laboratory Results: Hemoglobin 13  Radiology Review: .  Other Studies: .    Assessment & Plan      Pregnancy        Assessment:  1.  Intrauterine pregnancy at 40w0d gestation with reactive, reassuring fetal status.    2.  induction of labor  for 40 weeks status with spontaneous rupture membranes  with favorable cervix  3.  Obstetrical history significant for is non-contributory.  4.  GBS status:   Strep Gp B KRISTAN   Date Value Ref Range Status   2024 Negative Negative Final     Comment:     Centers for Disease Control and Prevention (CDC) and American Congress  of Obstetricians and Gynecologists (ACOG) guidelines for prevention of   group B streptococcal (GBS) disease specify co-collection of  a vaginal and rectal swab specimen to maximize sensitivity of GBS  detection. Per the CDC and ACOG, swabbing both the lower vagina and  rectum substantially increases the yield of detection compared with  sampling the vagina alone.  Penicillin G, ampicillin, or cefazolin are indicated for intrapartum  prophylaxis of  GBS colonization. Reflex susceptibility  testing should be performed prior to use of clindamycin only on GBS  isolates from penicillin-allergic women who are considered a high risk  for anaphylaxis. Treatment with vancomycin without additional testing  is warranted if resistance to clindamycin is noted.         Plan:  1. Vaginal anticipated, fetal and uterine monitoring  continuously, labor augmentation  Pitocin, and analgesia with  epidural  2. Plan of care has been reviewed with patient and she agrees  3.  Risks, benefits of treatment plan  have been discussed.  4.  All questions have been answered.  5.  .      Song Norwood MD  12/23/2024  15:59 EST      Electronically signed by Song Norwood MD at 12/23/24 1602       Facility-Administered Medications as of 12/24/2024   Medication Dose Route Frequency Provider Last Rate Last Admin    acetaminophen (TYLENOL) tablet 650 mg  650 mg Oral Q6H PRN Song Norwood MD   650 mg at 12/24/24 0143    all purpose nipple ointment 1 Application  1 Application Topical 4x Daily PRN Song Norwood MD        benzocaine (AMERICAINE) 20 % rectal ointment 1 Application  1 Application Rectal PRN Song Norwood MD        benzocaine-menthol (DERMOPLAST) 20-0.5 % topical spray   Topical PRN Song Norwood MD   Given at 12/24/24 0143    bisacodyl (DULCOLAX) suppository 10 mg  10 mg Rectal Daily PRN Song Norwood MD        diphenhydrAMINE (BENADRYL) capsule 25 mg  25 mg Oral Nightly PRN Song Norwood MD        docusate sodium (COLACE) capsule 100 mg  100 mg Oral BID Song Norwood MD   100 mg at 12/24/24 0143    fentaNYL 2mcg/mL and ropivacaine 0.2% in NS epidural 100mL  10 mL/hr Epidural Continuous Erick Topete MD   Stop/Waste (DUAL SIGN) at 12/23/24 2116    HYDROcodone-acetaminophen (NORCO) 5-325 MG per tablet 1 tablet  1 tablet Oral Q4H PRN Song Norwood MD        Or    HYDROcodone-acetaminophen (NORCO)  MG per tablet 1 tablet  1 tablet Oral Q4H PRN Song Norwood MD        Hydrocort-Pramoxine (Perianal) (PROCTOFOAM-HS) 1-1 % rectal foam 1 Application  1 Application Topical PRN Song Norwood MD        Hydrocortisone (Perianal) (ANUSOL-HC) 2.5 % rectal cream 1 Application  1 Application Rectal PRN Song Norwood MD        ibuprofen (ADVIL,MOTRIN) tablet 600 mg  600 mg Oral Q6H PRN Song Norwood MD   600 mg at 12/24/24 0404    lactated ringers infusion  125 mL/hr Intravenous Continuous Song Norwood MD   Stopped at 12/23/24 2221    lanolin topical 1 Application  1  Application Topical Q1H PRN Song Norwood MD        magnesium hydroxide (MILK OF MAGNESIA) suspension 10 mL  10 mL Oral Daily PRN Song Norwood MD        ondansetron ODT (ZOFRAN-ODT) disintegrating tablet 4 mg  4 mg Oral Q6H PRN Song Norwood MD        Or    ondansetron (ZOFRAN) injection 4 mg  4 mg Intravenous Q6H PRN Song Norwood MD        [] oxytocin (PITOCIN) 30 units in 0.9% sodium chloride 500 mL (premix)  250 mL/hr Intravenous Continuous Song Norwood  mL/hr at 24 250 mL/hr at 24    oxytocin (PITOCIN) 30 units in 0.9% sodium chloride 500 mL (premix)  2-20 suki-units/min Intravenous Titrated Song Norwood MD   Stopped at 24    [COMPLETED] oxytocin (PITOCIN) 30 units in 0.9% sodium chloride 500 mL (premix)  125 mL/hr Intravenous Once PRN Song Norwood  mL/hr at 24 125 mL/hr at 24 221    prenatal vitamin tablet 1 tablet  1 tablet Oral Daily Song Norwood MD        sodium chloride 0.9 % flush 1-10 mL  1-10 mL Intravenous PRN Song Norwood MD        tranexamic acid 1000 mg in 100 mL 0.7% NaCl infusion (premix)  1,000 mg Intravenous Once PRN Song Norwood MD         Lab Results (last 72 hours)       Procedure Component Value Units Date/Time    CBC & Differential [[id number]]  (Abnormal) Collected: 24    Specimen: Blood from Arm, Right Updated: 24    Narrative:      The following orders were created for panel order CBC & Differential.  Procedure                               Abnormality         Status                     ---------                               -----------         ------                     CBC Auto Differential[[id number]]        Abnormal            Final result                 Please view results for these tests on the individual orders.    CBC Auto Differential [[id number]]  (Abnormal) Collected: 24    Specimen: Blood from Arm, Right Updated: 24      WBC 20.26 10*3/mm3      RBC 3.46 10*6/mm3      Hemoglobin 10.1 g/dL      Hematocrit 29.8 %      MCV 86.1 fL      MCH 29.2 pg      MCHC 33.9 g/dL      RDW 12.0 %      RDW-SD 37.5 fl      MPV 10.1 fL      Platelets 302 10*3/mm3      Neutrophil % 78.6 %      Lymphocyte % 13.4 %      Monocyte % 5.7 %      Eosinophil % 0.3 %      Basophil % 0.3 %      Immature Grans % 1.7 %      Neutrophils, Absolute 15.90 10*3/mm3      Lymphocytes, Absolute 2.72 10*3/mm3      Monocytes, Absolute 1.16 10*3/mm3      Eosinophils, Absolute 0.07 10*3/mm3      Basophils, Absolute 0.06 10*3/mm3      Immature Grans, Absolute 0.35 10*3/mm3      nRBC 0.0 /100 WBC     Treponema pallidum AB w/Reflex RPR [[id number]]  (Normal) Collected: 12/23/24 1031    Specimen: Blood Updated: 12/23/24 1115     Treponemal AB Total Non-Reactive    Narrative:      Reactive results will reflex RPR testing.    Comprehensive Metabolic Panel [[id number]]  (Abnormal) Collected: 12/23/24 1031    Specimen: Blood Updated: 12/23/24 1109     Glucose 93 mg/dL      BUN 7 mg/dL      Creatinine 0.59 mg/dL      Sodium 132 mmol/L      Potassium 4.2 mmol/L      Chloride 99 mmol/L      CO2 22.2 mmol/L      Calcium 9.5 mg/dL      Total Protein 7.4 g/dL      Albumin 3.5 g/dL      ALT (SGPT) 12 U/L      AST (SGOT) 16 U/L      Alkaline Phosphatase 186 U/L      Total Bilirubin 0.2 mg/dL      Globulin 3.9 gm/dL      A/G Ratio 0.9 g/dL      BUN/Creatinine Ratio 11.9     Anion Gap 10.8 mmol/L      eGFR 124.5 mL/min/1.73     Narrative:      GFR Categories in Chronic Kidney Disease (CKD)      GFR Category          GFR (mL/min/1.73)    Interpretation  G1                     90 or greater         Normal or high (1)  G2                      60-89                Mild decrease (1)  G3a                   45-59                Mild to moderate decrease  G3b                   30-44                Moderate to severe decrease  G4                    15-29                Severe decrease  G5                     14 or less           Kidney failure          (1)In the absence of evidence of kidney disease, neither GFR category G1 or G2 fulfill the criteria for CKD.    eGFR calculation 2021 CKD-EPI creatinine equation, which does not include race as a factor    CBC & Differential [[id number]]  (Abnormal) Collected: 12/23/24 1031    Specimen: Blood Updated: 12/23/24 1050    Narrative:      The following orders were created for panel order CBC & Differential.  Procedure                               Abnormality         Status                     ---------                               -----------         ------                     CBC Auto Differential[[id number]]        Abnormal            Final result                 Please view results for these tests on the individual orders.    CBC Auto Differential [[id number]]  (Abnormal) Collected: 12/23/24 1031    Specimen: Blood Updated: 12/23/24 1050     WBC 16.15 10*3/mm3      RBC 4.39 10*6/mm3      Hemoglobin 13.0 g/dL      Hematocrit 37.4 %      MCV 85.2 fL      MCH 29.6 pg      MCHC 34.8 g/dL      RDW 12.0 %      RDW-SD 37.0 fl      MPV 10.0 fL      Platelets 396 10*3/mm3      Neutrophil % 77.9 %      Lymphocyte % 13.9 %      Monocyte % 5.1 %      Eosinophil % 0.5 %      Basophil % 0.4 %      Immature Grans % 2.2 %      Neutrophils, Absolute 12.57 10*3/mm3      Lymphocytes, Absolute 2.25 10*3/mm3      Monocytes, Absolute 0.83 10*3/mm3      Eosinophils, Absolute 0.08 10*3/mm3      Basophils, Absolute 0.06 10*3/mm3      Immature Grans, Absolute 0.36 10*3/mm3      nRBC 0.0 /100 WBC     Urinalysis With Culture If Indicated - Urine, Clean Catch [[id number]]  (Abnormal) Collected: 12/23/24 0958    Specimen: Urine, Clean Catch Updated: 12/23/24 1018     Color, UA Yellow     Appearance, UA Clear     pH, UA 7.0     Specific Gravity, UA <=1.005     Glucose, UA Negative     Ketones, UA Negative     Bilirubin, UA Negative     Blood, UA Small (1+)     Protein, UA Negative     Leuk Esterase,  UA Trace     Nitrite, UA Negative     Urobilinogen, UA 0.2 E.U./dL    Narrative:      In absence of clinical symptoms, the presence of pyuria, bacteria, and/or nitrites on the urinalysis result does not correlate with infection.    Urinalysis, Microscopic Only - Urine, Clean Catch [[id number]]  (Abnormal) Collected: 12/23/24 0958    Specimen: Urine, Clean Catch Updated: 12/23/24 1018     RBC, UA 0-2 /HPF      WBC, UA 6-10 /HPF      Bacteria, UA Trace /HPF      Squamous Epithelial Cells, UA 13-20 /HPF      Hyaline Casts, UA None Seen /LPF      Methodology Automated Microscopy    Urine Culture - Urine, Urine, Clean Catch [[id number]] Collected: 12/23/24 0958    Specimen: Urine, Clean Catch Updated: 12/23/24 1015    Rapid Assay For ROM - Amniotic Fluid, Amniotic Sac [[id number]]  (Abnormal) Collected: 12/23/24 0958    Specimen: Amniotic Fluid from Amniotic Sac Updated: 12/23/24 1009     Rupture of Membranes Positive          Imaging Results (Last 72 Hours)       ** No results found for the last 72 hours. **          ECG/EMG Results (last 72 hours)       ** No results found for the last 72 hours. **          Orders (last 72 hrs)        Start     Ordered    12/24/24 0900  prenatal vitamin tablet 1 tablet  Daily         12/23/24 2328 12/24/24 0800  Sitz Bath  3 Times Daily        Comments: PRN    12/23/24 2328 12/24/24 0600  CBC & Differential  Morning Draw        Comments: Postpartum Day 1      12/23/24 2328 12/24/24 0600  Hemoglobin & Hematocrit, Blood  Morning Draw,   Status:  Canceled        Comments: Postpartum Day 1      12/23/24 2328 12/24/24 0600  CBC Auto Differential  PROCEDURE ONCE        Comments: Postpartum Day 1      12/23/24 2328 12/24/24 0015  docusate sodium (COLACE) capsule 100 mg  2 Times Daily         12/23/24 2328 12/24/24 0000  bisacodyl (DULCOLAX) suppository 10 mg  Daily PRN         12/23/24 2328 12/23/24 2329  Transfer to postpartum when discharge criteria met.  Until  Discontinued         12/23/24 2328 12/23/24 2329  Transfer Patient  Once         12/23/24 2328 12/23/24 2329  Code Status and Medical Interventions: CPR (Attempt to Resuscitate); Full  Continuous         12/23/24 2328 12/23/24 2329  Vital Signs Per Hospital Policy  Per Hospital Policy         12/23/24 2328 12/23/24 2329  Notify Provider  Continuous        Comments: Open Order Report to View Parameters Requiring Provider Notification    12/23/24 2328 12/23/24 2329  Up Ad Susie  Until Discontinued         12/23/24 2328 12/23/24 2329  Ambulate Patient  Every Shift       12/23/24 2328 12/23/24 2329  Diet: Regular/House; Fluid Consistency: Thin (IDDSI 0)  Diet Effective Now         12/23/24 2328 12/23/24 2329  Advance Diet As Tolerated -Regular  Until Discontinued         12/23/24 2328 12/23/24 2329  Fundal and Lochia Check  Per Order Details        Comments: Every 30 minutes x2, then Every Shift    12/23/24 2328 12/23/24 2329  RN to Assess Rh Status & Place RhIG Evaluation Order if Indicated  Continuous         12/23/24 2328 12/23/24 2329  Bladder Assessment  Per Order Details        Comments: Postpartum 1) Upon Admission to Unit & Every 4 Hours PRN Until Voiding. 2) Out of Bed to Void in 8 Hours.    12/23/24 2328 12/23/24 2329  Straight Cath  Per Order Details        Comments: Postpartum: If Distended & Unable to Void, May Repeat Once.    12/23/24 2328 12/23/24 2329  Indwelling Urinary Catheter  Per Order Details        Comments: Postpartum : After Straight Cathed x2 or if Greater Than 1000mL Residual, Insert Indwelling Urinary Catheter Until Further MD Order.    12/23/24 2328 12/23/24 2329  Apply Ice to Perineum  Per Order Details        Comments: For 20 Minutes Every 2 Hours    12/23/24 2328 12/23/24 2329  Waffle Cushion  Per Order Details        Comments: For Perineal Discomfort    12/23/24 2328 12/23/24 2329  Donut Ring  Per Order Details        Comments: For Perineal  "Pain    12/23/24 2328 12/23/24 2329  Kpad  Per Order Details        Comments: For Pain    12/23/24 2328 12/23/24 2329  Breast pump to bed  Once         12/23/24 2328 12/23/24 2329  If indicated -- Please administer RH Immunoglobulin based on results of cord blood evaluation and fetal screen lab tests, pharmacy to dispense  Per Order Details        Comments: See Process Instructions For Reference Range Details.    12/23/24 2328 12/23/24 2328  sodium chloride 0.9 % flush 1-10 mL  As Needed         12/23/24 2328 12/23/24 2328  HYDROcodone-acetaminophen (NORCO) 5-325 MG per tablet 1 tablet  Every 4 Hours PRN        Placed in \"Or\" Linked Group    12/23/24 2328 12/23/24 2328  HYDROcodone-acetaminophen (NORCO)  MG per tablet 1 tablet  Every 4 Hours PRN        Placed in \"Or\" Linked Group    12/23/24 2328 12/23/24 2328  magnesium hydroxide (MILK OF MAGNESIA) suspension 10 mL  Daily PRN         12/23/24 2328 12/23/24 2328  benzocaine-menthol (DERMOPLAST) 20-0.5 % topical spray  As Needed         12/23/24 2328 12/23/24 2328  Hydrocort-Pramoxine (Perianal) (PROCTOFOAM-HS) 1-1 % rectal foam 1 Application  As Needed         12/23/24 2328 12/23/24 2328  Hydrocortisone (Perianal) (ANUSOL-HC) 2.5 % rectal cream 1 Application  As Needed         12/23/24 2328 12/23/24 2328  benzocaine (AMERICAINE) 20 % rectal ointment 1 Application  As Needed         12/23/24 2328 12/23/24 2328  diphenhydrAMINE (BENADRYL) capsule 25 mg  Nightly PRN         12/23/24 2328 12/23/24 2328  ondansetron ODT (ZOFRAN-ODT) disintegrating tablet 4 mg  Every 6 Hours PRN        Placed in \"Or\" Linked Group    12/23/24 2328 12/23/24 2328  ondansetron (ZOFRAN) injection 4 mg  Every 6 Hours PRN        Placed in \"Or\" Linked Group    12/23/24 2328 12/23/24 2328  lanolin topical 1 Application  Every 1 Hour PRN         12/23/24 2328    12/23/24 2328  all purpose nipple ointment 1 Application  4 Times Daily PRN         " "12/23/24 2328 12/23/24 2148  oxytocin (PITOCIN) 30 units in 0.9% sodium chloride 500 mL (premix)  Once As Needed         12/23/24 2148 12/23/24 2148  ibuprofen (ADVIL,MOTRIN) tablet 600 mg  Every 6 Hours PRN         12/23/24 2148 12/23/24 2148  acetaminophen (TYLENOL) tablet 650 mg  Every 6 Hours PRN         12/23/24 2148 12/23/24 2123  VTE Prophylaxis Not Indicated: Low Risk; No Risk Factors (0)  Once         12/23/24 2123 12/23/24 1900  oxytocin (PITOCIN) 30 units in 0.9% sodium chloride 500 mL (premix)  Continuous        Placed in \"Followed by\" Linked Group    12/23/24 1750 12/23/24 1750  oxytocin (PITOCIN) 30 units in 0.9% sodium chloride 500 mL (premix)  Once,   Status:  Discontinued        Placed in \"Followed by\" Linked Group    12/23/24 1750    12/23/24 1750  methylergonovine (METHERGINE) injection 200 mcg  Once As Needed,   Status:  Discontinued         12/23/24 1750    12/23/24 1750  carboprost (HEMABATE) injection 250 mcg  Every 15 Minutes PRN,   Status:  Discontinued         12/23/24 1750    12/23/24 1750  miSOPROStol (CYTOTEC) tablet 800 mcg  Once As Needed,   Status:  Discontinued         12/23/24 1750    12/23/24 1750  tranexamic acid 1000 mg in 100 mL 0.7% NaCl infusion (premix)  Once As Needed         12/23/24 1750    12/23/24 1315  fentaNYL 2mcg/mL and ropivacaine 0.2% in NS epidural 100mL  Continuous         12/23/24 1219    12/23/24 1220  Vital Signs Per Anesthesia Guidelines  Per Order Details,   Status:  Canceled        Comments: Every 2 Minutes x5, Every 5 Minutes x4, Then If Stable Every 15 Minutes    12/23/24 1219    12/23/24 1220  Start IV with #16 or #18 gauge angiocath.  Once,   Status:  Canceled         12/23/24 1219    12/23/24 1220  Fetal Heart Rate Monitor  Once,   Status:  Canceled         12/23/24 1219    12/23/24 1220  Nurse or anesthesiologist to remain with patient for 15 minutes following dosing.  Until Discontinued,   Status:  Canceled         12/23/24 1219    " "12/23/24 1220  Facilitate maternal postion on side and maintain uterine displacement.  Until Discontinued,   Status:  Canceled         12/23/24 1219    12/23/24 1220  Consult anesthesia services prior to changing epidural infusion/rate.  Until Discontinued,   Status:  Canceled         12/23/24 1219    12/23/24 1220  Nurse may remove epidural catheter after delivery.  Until Discontinued,   Status:  Canceled         12/23/24 1219    12/23/24 1220  Insert Indwelling Urinary Catheter  Once,   Status:  Canceled        Placed in \"And\" Linked Group    12/23/24 1219    12/23/24 1220  Assess Need for Indwelling Urinary Catheter - Follow Removal Protocol  Continuous,   Status:  Canceled        Comments: Indwelling Urinary Catheter Removal Criteria  Discontinue Indwelling Urinary Catheter Unless One of the Following is Present:  Urinary Retention or Obstruction  Chronic Urinary Catheter Use  End of Life  Critical Illness with Strict I/O   Tract or Abdominal Surgery  Stage 3/4 Sacral / Perineal Wound  Required Activity Restriction: Trauma  Required Activity Restriction: Spine Surgery  If Patient is Being Followed by Urology Contact Them PRIOR to Removal  Do Not Remove Indwelling Urinary Catheter Order is Present with a CLINICAL REASON to Maintain the Catheter. Provider is Required to Include a Clinical Reason to Maintain a Urinary Catheter    Patient Admitted With Indwelling Urinary Catheter (Not Placed at [facility])  Assess for Continued Need & Document Medical Necessity  If Infection is Suspected, Contact the Provider       Placed in \"And\" Linked Group    12/23/24 1219    12/23/24 1220  Urinary Catheter Care  Every Shift,   Status:  Canceled      Placed in \"And\" Linked Group    12/23/24 1219    12/23/24 1220  Notify physician for the following conditions:  Until Discontinued,   Status:  Canceled         12/23/24 1219    12/23/24 1219  ePHEDrine injection 5 mg  Every 15 Minutes PRN,   Status:  Discontinued         " 12/23/24 1219    12/23/24 1219  ondansetron (ZOFRAN) injection 4 mg  Once As Needed,   Status:  Discontinued         12/23/24 1219    12/23/24 1219  famotidine (PEPCID) injection 20 mg  Once As Needed,   Status:  Discontinued         12/23/24 1219    12/23/24 1219  diphenhydrAMINE (BENADRYL) injection 12.5 mg  Every 8 Hours PRN,   Status:  Discontinued         12/23/24 1219    12/23/24 1130  oxytocin (PITOCIN) 30 units in 0.9% sodium chloride 500 mL (premix)  Titrated         12/23/24 1034    12/23/24 1115  lactated ringers infusion  Continuous         12/23/24 1025    12/23/24 1026  Code Status and Medical Interventions: CPR (Attempt to Resuscitate); Full Support  Continuous,   Status:  Canceled         12/23/24 1025    12/23/24 1026  Obtain Informed Consent  Once,   Status:  Canceled         12/23/24 1025    12/23/24 1026  Vital Signs Per Hospital Policy  Per Hospital Policy,   Status:  Canceled         12/23/24 1025    12/23/24 1026  Mini-Prep Prior to Delivery  Once,   Status:  Canceled         12/23/24 1025    12/23/24 1026  Continuous Fetal Monitoring With NST on Admission and Prior to Initiation of Oxytocin.  Per Order Details,   Status:  Canceled        Comments: Continuous Fetal Monitoring With NST on Admission & Prior to Initiation of Oxytocin.    12/23/24 1025    12/23/24 1026  External Uterine Contraction Monitoring  Per Hospital Policy,   Status:  Canceled         12/23/24 1025    12/23/24 1026  Notify Provider (Specified)  Until Discontinued,   Status:  Canceled         12/23/24 1025    12/23/24 1026  Notify Provider of Tachysystole (Per Hospital Algorithm)  Until Discontinued,   Status:  Canceled         12/23/24 1025    12/23/24 1026  Notify Provider if Membranes Ruptured, Bleeding Greater Than 1 Pad Per Hour, Fetal Heart Tone Abnormality or Severe Pain  Until Discontinued,   Status:  Canceled         12/23/24 1025    12/23/24 1026  Initiate Group Beta Strep (GBS) Prophylaxis Protocol, If Criteria  Met  Continuous,   Status:  Canceled        Comments: NO TREATMENT RECOMMENDED IF: 1) Maternal GBS Status Known Negative 2) Scheduled  Birth With Intact Membranes, Not in Labor 3) Maternal GBS Status Unknown, No Risk Factors  TREAT WITH ANTIBIOTICS IF:  1) Maternal GBS Status Known Positive 2) Maternal GBS Status Unknown With Risk Factors: a)  Previous Infant Affected By GBS Infection b) GBS Urinary Tract Infection (UTI) or Bacteriuria During Pregnancy c) Unexplained Maternal Fever (100.4F (38C) or Greater) During Labor d)  Prolonged Rupture of Membranes (18 or More Hours) e) Gestational Age Less Than 37 Weeks    24 1025    24 1026  Inpatient Anesthesiology Consult  Once,   Status:  Canceled        Specialty:  Anesthesiology  Provider:  (Not yet assigned)    24 1025    24 1026  Treponema pallidum AB w/Reflex RPR  Once         24 1025    24 1026  CBC & Differential  Once         24 1025    24 1026  Comprehensive Metabolic Panel  Once         24 1025    24 1026  Type & Screen  Once         24 1025    24 1026  Insert Peripheral IV  Once,   Status:  Canceled         24 1025    24 1026  Saline Lock & Maintain IV Access  Continuous,   Status:  Canceled         24 1025    24 1026  Diet: Liquid; Clear Liquid; Fluid Consistency: Thin (IDDSI 0)  Diet Effective Now,   Status:  Canceled         24 1025    24 1026  CBC Auto Differential  PROCEDURE ONCE         24 1025    24 1025  Position Change - For Intra-Uterine Resusitation for Hypertonus, HyperStimulation or Non-Reassuring Fetal Status  As Needed,   Status:  Canceled       24 1025    24 1025  sodium chloride 0.9 % flush 10 mL  As Needed,   Status:  Discontinued         24 1025    24 1025  lactated ringers bolus 1,000 mL  Once As Needed,   Status:  Discontinued         24 1025    24 1025  acetaminophen (TYLENOL)  "tablet 650 mg  Every 4 Hours PRN,   Status:  Discontinued         12/23/24 1025    12/23/24 1025  butorphanol (STADOL) injection 2 mg  Every 3 Hours PRN,   Status:  Discontinued         12/23/24 1025    12/23/24 1025  ondansetron ODT (ZOFRAN-ODT) disintegrating tablet 4 mg  Every 6 Hours PRN,   Status:  Discontinued        Placed in \"Or\" Linked Group    12/23/24 1025    12/23/24 1025  ondansetron (ZOFRAN) injection 4 mg  Every 6 Hours PRN,   Status:  Discontinued        Placed in \"Or\" Linked Group    12/23/24 1025    12/23/24 1025  terbutaline (BRETHINE) injection 0.25 mg  As Needed,   Status:  Discontinued         12/23/24 1025    12/23/24 1025  mineral oil liquid 30 mL  Once As Needed,   Status:  Discontinued         12/23/24 1025    12/23/24 1025  famotidine (PEPCID) injection 20 mg  2 Times Daily PRN,   Status:  Discontinued        Placed in \"Or\" Linked Group    12/23/24 1025    12/23/24 1025  famotidine (PEPCID) tablet 20 mg  2 Times Daily PRN,   Status:  Discontinued        Placed in \"Or\" Linked Group    12/23/24 1025    12/23/24 1025  Admit To Obstetrics Inpatient  Once         12/23/24 1025    12/23/24 1025  VTE Prophylaxis Not Indicated: Low Risk; No Risk Factors (0)  Once         12/23/24 1025    12/23/24 1016  Urinalysis, Microscopic Only - Urine, Clean Catch  Once        Comments: Collect and Hold for gestational age > 24 weeks      12/23/24 1015    12/23/24 1016  Urine Culture - Urine, Urine, Clean Catch  Once        Comments: Collect and Hold for gestational age > 24 weeks      12/23/24 1015    12/23/24 0936  Vital Signs  Per Hospital Policy        Comments: Repeat blood pressure every 30 minutes, until specified.    12/23/24 0935    12/23/24 0936  Fetal Nonstress Test  Once        Comments: For any patient >= 24 wks gestation.    12/23/24 0935    12/23/24 0936  Pulse Oximetry, Spot  Once         12/23/24 0935    12/23/24 0936  Urinalysis With Culture If Indicated - Urine, Clean Catch  Once      "   Comments: Collect and Hold for gestational age > 24 weeks      24 0935    24  Continuous Uterine Monitoring - For Gestational Age >24 weeks  Once         2435    24  NPO Diet NPO Type: Strict NPO  Diet Effective Now,   Status:  Canceled         2435    24  Vaginal Exam  Once        Comments: Perform unless patient has vaginal bleeding without known placental site, known placental previa, <36 weeks with no abdominal , or complain of ROM and <36 weeks    24 0935    24  POC PH Fluid (Nitrazine)  Once        Comments: If patient is presenting with possible ROM or leaking      2435    24  Rapid Assay For ROM - Amniotic Fluid, Amniotic Sac  Once         24    Unscheduled  Apply witch hazel pads / TUCKS to perineum as needed for comfort PRN  As Needed       24 2328    Unscheduled  Warm compress  As Needed       24 2328    Unscheduled  Apply ace wrap, tight bra, or binder  As Needed       24 2328    Unscheduled  Apply ice packs  As Needed       24 2328    Signed and Held  Notify Provider (Specified)  Until Discontinued         Signed and Held    Signed and Held  Vital Signs Per Hospital Policy  Per Hospital Policy         Signed and Held    Signed and Held  Fundal & Lochia Check  Per Order Details      Comments: Every 15 Minutes x4, Then Every 30 Minutes x2, Then Every Shift    Signed and Held    Signed and Held  Fundal & Lochia Check  Every Shift       Signed and Held    Signed and Held  Diet: Regular/House; Fluid Consistency: Thin (IDDSI 0)  Diet Effective Now         Signed and Held    Signed and Held  Advance Diet As Tolerated -  Until Discontinued         Signed and Held                     Operative/Procedure Notes (last 72 hours)        Song Norwood MD at 24 2123           [facility]   Vaginal Delivery Note    Patient Name: Camille Alford  : 1995  MRN:  [id number]    Date of Delivery: 12/23/2024    Diagnosis     Pre & Post-Delivery:  Intrauterine pregnancy at 40w0d  Labor status: Spontaneous Onset of Labor    Pregnancy             Problem List    Transfer to Postpartum     Review the Delivery Report for details.     Delivery     Delivery: Vaginal, Spontaneous    YOB: 2025   Time of Birth:  Gestational Age 9:01 PM  40w0d     Anesthesia: Epidural    Delivering clinician: Song Norwood   Forceps?   No   Vacuum? No    Shoulder dystocia present: No        Delivery narrative: Called to room with patient complete and pushing very well.  Category 1 tracing and good working epidural.  She pushes and then delivers left occiput anterior.  There is no nuchal cord.  Anterior shoulder is delivered with continued maternal effort and downward traction.  Baby is delivered and placed on maternal abdomen for 30 seconds prior to cord clamping.  Cord is clamped and cut and cord blood is obtained.  Placenta separates about 4 minutes after delivery of the baby spontaneously and intact with three-vessel cord.  There is a spontaneous midline second-degree laceration repaired with 2-0 Vicryl Rapide and 3-0 Vicryl Rapide.  Good uterine tone and good hemostasis is noted following the delivery.      Infant     Findings: female infant     Infant observations: Weight: No birth weight on file.  Length:   in  Observations/Comments:  scale 2     Apgars: 8  @ 1 minute /    9  @ 5 minutes   Infant Name: Bernadette     Placenta & Cord         Placenta delivered  Spontaneous at   12/23/2024  9:06 PM    Cord:   present.   Nuchal Cord?  no   Cord blood obtained: Yes   Cord gases obtained:  No   Cord gas results: Venous:  No results found for: \"PHCVEN\", \"BECVEN\"    Arterial:  No results found for: \"PHCART\", \"BECART\"     Repair     Episiotomy: Not recorded    No    Lacerations: Yes  Laceration Information  Laceration Repaired?   Perineal: 2nd Yes   Periurethral:       Labial:     "   Sulcus:       Vaginal:       Cervical:         Suture used for repair: 2-0 and 3-0 Vicryl Rapide   Laceration Length for 3rd or 4th degree lacerations:  cm   Estimated Blood Loss:  100 cc     Quantitative Blood Loss: Quantitative Blood Loss (mL): 49 mL (12/23/24 2115)        Complications     none    Disposition     Mother to Mother Baby/Postpartum  in stable condition currently.  Baby to remains with mom  in stable condition currently.    Song Norwood MD  12/23/24  21:23 EST          Electronically signed by Song Norwood MD at 12/23/24 2126       Physician Progress Notes (last 72 hours)  Notes from 12/21/24 0719 through 12/24/24 0719   No notes of this type exist for this encounter.       Consult Notes (last 72 hours)  Notes from 12/21/24 0719 through 12/24/24 0719   No notes of this type exist for this encounter.

## 2024-12-24 NOTE — PLAN OF CARE
Problem: Adult Inpatient Plan of Care  Goal: Plan of Care Review  Outcome: Progressing  Flowsheets (Taken 2024 2303)  Progress: improving  Outcome Evaluation: pt delivered via  at 2101. scant bleeding during recovery. 2nd degree tear that was repaired. pain currently 2.5- received Motrin thus far.  Plan of Care Reviewed With:   patient   spouse  Goal: Patient-Specific Goal (Individualized)  Outcome: Progressing  Goal: Absence of Hospital-Acquired Illness or Injury  Outcome: Progressing  Intervention: Identify and Manage Fall Risk  Recent Flowsheet Documentation  Taken 2024 221 by Cuba Morgan RN  Safety Promotion/Fall Prevention: safety round/check completed  Taken 2024 1905 by Cuba Morgan RN  Safety Promotion/Fall Prevention: safety round/check completed  Goal: Optimal Comfort and Wellbeing  Outcome: Progressing  Intervention: Monitor Pain and Promote Comfort  Recent Flowsheet Documentation  Taken 2024 214 by Cuba Morgan RN  Pain Management Interventions:   pain management plan reviewed with patient/caregiver   pain medication given  Taken 2024 213 by Cuba Morgan RN  Pain Management Interventions: no interventions per patient request  Taken 2024 190 by Cuba Morgan RN  Pain Management Interventions: (pushing epidural button) other (see comments)  Intervention: Provide Person-Centered Care  Recent Flowsheet Documentation  Taken 2024 2245 by Cuba Morgan RN  Trust Relationship/Rapport:   care explained   choices provided   emotional support provided   questions answered   questions encouraged  Taken 2024 2230 by Cuba Morgan RN  Trust Relationship/Rapport:   care explained   choices provided   emotional support provided   questions answered   questions encouraged  Taken 2024 2215 by Cuba Morgan RN  Trust Relationship/Rapport:   care explained   choices provided   emotional support provided   questions answered   questions encouraged  Taken  2024 by Cuba Morgan RN  Trust Relationship/Rapport:   care explained   choices provided   emotional support provided   questions answered   questions encouraged  Taken 2024 by Cuba Morgan RN  Trust Relationship/Rapport:   care explained   choices provided   emotional support provided   questions answered   questions encouraged  Taken 2024 by Cuba Morgan RN  Trust Relationship/Rapport:   care explained   choices provided   emotional support provided   questions answered   questions encouraged  Taken 2024 190 by Cuba Morgan RN  Trust Relationship/Rapport:   care explained   choices provided   emotional support provided   questions answered   questions encouraged  Goal: Readiness for Transition of Care  Outcome: Progressing     Problem: Skin Injury Risk Increased  Goal: Skin Health and Integrity  Outcome: Progressing  Intervention: Optimize Skin Protection  Recent Flowsheet Documentation  Taken 2024 by Cuba Morgan RN  Activity Management: bedrest   Goal Outcome Evaluation:  Plan of Care Reviewed With: patient, spouse        Progress: improving  Outcome Evaluation: pt delivered via  at 2101. scant bleeding during recovery. 2nd degree tear that was repaired. pain currently 2.5- received Motrin thus far.

## 2024-12-24 NOTE — L&D DELIVERY NOTE
" [facility]   Vaginal Delivery Note    Patient Name: Camille Alford  : 1995  MRN: [id number]    Date of Delivery: 2024    Diagnosis     Pre & Post-Delivery:  Intrauterine pregnancy at 40w0d  Labor status: Spontaneous Onset of Labor    Pregnancy             Problem List    Transfer to Postpartum     Review the Delivery Report for details.     Delivery     Delivery: Vaginal, Spontaneous    YOB: 2025   Time of Birth:  Gestational Age 9:01 PM  40w0d     Anesthesia: Epidural    Delivering clinician: Song Norwood   Forceps?   No   Vacuum? No    Shoulder dystocia present: No        Delivery narrative: Called to room with patient complete and pushing very well.  Category 1 tracing and good working epidural.  She pushes and then delivers left occiput anterior.  There is no nuchal cord.  Anterior shoulder is delivered with continued maternal effort and downward traction.  Baby is delivered and placed on maternal abdomen for 30 seconds prior to cord clamping.  Cord is clamped and cut and cord blood is obtained.  Placenta separates about 4 minutes after delivery of the baby spontaneously and intact with three-vessel cord.  There is a spontaneous midline second-degree laceration repaired with 2-0 Vicryl Rapide and 3-0 Vicryl Rapide.  Good uterine tone and good hemostasis is noted following the delivery.      Infant     Findings: female infant     Infant observations: Weight: No birth weight on file.  Length:   in  Observations/Comments:  scale 2     Apgars: 8  @ 1 minute /    9  @ 5 minutes   Infant Name: Bernadette     Placenta & Cord         Placenta delivered  Spontaneous at   2024  9:06 PM    Cord:   present.   Nuchal Cord?  no   Cord blood obtained: Yes   Cord gases obtained:  No   Cord gas results: Venous:  No results found for: \"PHCVEN\", \"BECVEN\"    Arterial:  No results found for: \"PHCART\", \"BECART\"     Repair     Episiotomy: Not recorded    No    Lacerations: Yes  Laceration " Information  Laceration Repaired?   Perineal: 2nd Yes   Periurethral:       Labial:       Sulcus:       Vaginal:       Cervical:         Suture used for repair: 2-0 and 3-0 Vicryl Rapide   Laceration Length for 3rd or 4th degree lacerations:  cm   Estimated Blood Loss:  100 cc     Quantitative Blood Loss: Quantitative Blood Loss (mL): 49 mL (12/23/24 2115)        Complications     none    Disposition     Mother to Mother Baby/Postpartum  in stable condition currently.  Baby to remains with mom  in stable condition currently.    Song Norwood MD  12/23/24  21:23 EST

## 2024-12-24 NOTE — ANESTHESIA POSTPROCEDURE EVALUATION
Patient: Camille Alford    Procedure Summary       Date: 12/23/24 Room / Location:     Anesthesia Start: 1608 Anesthesia Stop: 2101    Procedure: LABOR ANALGESIA Diagnosis:     Scheduled Providers:  Provider: Tai Radford DO    Anesthesia Type: epidural ASA Status: 2            Anesthesia Type: epidural    Vitals  Vitals Value Taken Time   /137 12/23/24 2130   Temp 37.1 °C (98.8 °F) 12/23/24 2107   Pulse 96 12/23/24 2129   Resp 18 12/23/24 2115   SpO2 96 % 12/23/24 2129   Vitals shown include unfiled device data.        Post Anesthesia Care and Evaluation      Comments: I and/or one of my partners was immediately available throughout the patient's labor and the immediate post partum period.

## 2024-12-24 NOTE — LACTATION NOTE
This note was copied from a baby's chart.  Attempted to help mom with BF, but baby is too sleepy. Also she has been using a pacifier and got a bottle earlier and it looks like she has a nipple confusion. She will grab the nipple and will do 2 sucks, but then is getting discouraged and pulls away. Different rousing techniques attempted and even nipple shield tried, but baby will still not latching persistently. Mom will pump and will try again to BF later, when baby is more alert.

## 2024-12-25 VITALS
HEIGHT: 67 IN | HEART RATE: 81 BPM | WEIGHT: 162.6 LBS | SYSTOLIC BLOOD PRESSURE: 109 MMHG | TEMPERATURE: 97.8 F | BODY MASS INDEX: 25.52 KG/M2 | DIASTOLIC BLOOD PRESSURE: 71 MMHG | RESPIRATION RATE: 16 BRPM | OXYGEN SATURATION: 98 %

## 2024-12-25 RX ORDER — IBUPROFEN 600 MG/1
600 TABLET, FILM COATED ORAL EVERY 6 HOURS PRN
Qty: 30 TABLET | Refills: 0 | Status: SHIPPED | OUTPATIENT
Start: 2024-12-25

## 2024-12-25 RX ORDER — PSEUDOEPHEDRINE HCL 30 MG
100 TABLET ORAL 2 TIMES DAILY
Qty: 20 CAPSULE | Refills: 0 | Status: SHIPPED | OUTPATIENT
Start: 2024-12-25

## 2024-12-25 RX ADMIN — DOCUSATE SODIUM 100 MG: 100 CAPSULE, LIQUID FILLED ORAL at 08:30

## 2024-12-25 RX ADMIN — Medication: at 08:38

## 2024-12-25 RX ADMIN — ACETAMINOPHEN 650 MG: 325 TABLET ORAL at 08:30

## 2024-12-25 RX ADMIN — IBUPROFEN 600 MG: 600 TABLET, FILM COATED ORAL at 02:52

## 2024-12-25 RX ADMIN — Medication 1 TABLET: at 08:30

## 2024-12-25 RX ADMIN — MAGNESIUM HYDROXIDE 10 ML: 2400 SUSPENSION ORAL at 08:38

## 2024-12-25 NOTE — DISCHARGE SUMMARY
Date of Discharge:  2024    Discharge Diagnosis: 1.  Intrauterine pregnancy at 40-0/7 weeks, delivered    Presenting Problem/History of Present Illness  Pregnancy [Z34.90]       Hospital Course  Patient is a 29 y.o. female presented at 40 weeks for an induction of labor.  She progressed to spontaneous vaginal delivery of a vigorous female .  For event surrounding delivery, please see the delivery note.  Postpartum course was smooth.  By postpartum day #2, the patient was afebrile, tolerating a regular diet and her lochia was minimal.  At this point, the patient requested discharge home and I felt that she was stable for this.    Procedures Performed         Consults:   Consults       No orders found from 2024 to 2024.                Condition on Discharge: Stable    Vital Signs  Temp:  [97.8 °F (36.6 °C)-98 °F (36.7 °C)] 97.8 °F (36.6 °C)  Heart Rate:  [81-93] 81  Resp:  [16] 16  BP: (109-116)/(68-71) 109/71    Physical Exam:   The abdomen is soft and nondistended.  There is a reducible umbilical hernia, which is not tender to palpation.  The fundus is firm and below the umbilicus.  Extremities are equal in size    Discharge Disposition  Home or Self Care    Discharge Medications     Discharge Medications        New Medications        Instructions Start Date   docusate sodium 100 MG capsule   100 mg, Oral, 2 Times Daily      ibuprofen 600 MG tablet  Commonly known as: ADVIL,MOTRIN   600 mg, Oral, Every 6 Hours PRN             Continue These Medications        Instructions Start Date   albuterol sulfate  (90 Base) MCG/ACT inhaler  Commonly known as: PROVENTIL HFA;VENTOLIN HFA;PROAIR HFA   2 puffs      PRENATAL 1 PO              Stop These Medications      Magnesium Carbonate powder              Discharge Diet:     Activity at Discharge:     Follow-up Appointments  Future Appointments   Date Time Provider Department Center   2024 10:45 AM Song Norwood MD [facility]    1/22/2025  5:15 PM [facility] INDUCTION [facility]OM [facility]     Additional Instructions for the Follow-ups that You Need to Schedule       Call MD With Problems / Concerns   As directed      Instructions: Call for fever, severe abdominal pain, heavy bleeding or any other concerns.    Order Comments: Instructions: Call for fever, severe abdominal pain, heavy bleeding or any other concerns.         Discharge Follow-up with Specified Provider: Dr Norwood; 6 Weeks   As directed      To: Dr Norwood   Follow Up: 6 Weeks                Test Results Pending at Discharge       Andrei Arreguin MD  12/25/24  11:54 EST

## 2024-12-25 NOTE — LACTATION NOTE
Pt reports she is pumping and giving baby pumped colostrum. She pumped 3 cc's the last pumping. Baby is also getting formula supplement. Educated on when to expect milk to come in, supply and demand, baby's expected output and weight gain. Pt has [facility] info for f/u. Discussed pumping every 3 hours if pt is still wanting to exclusively pump.    Lactation Consult Note    Evaluation Completed: 2024 08:47 EST  Patient Name: Camille Alford  :  1995  MRN:  [id number]     REFERRAL  INFORMATION:                                         DELIVERY HISTORY:        Skin to skin initiation date/time: 2024 9:06 PM  Skin to skin end date/time: 2024 10:05 PM       MATERNAL ASSESSMENT:                               INFANT ASSESSMENT:  Information for the patient's :  Edgard Alford [[id number]]   No past medical history on file.                                                                                                  MATERNAL INFANT FEEDING:                                                                       EQUIPMENT TYPE:                                 BREAST PUMPING:          LACTATION REFERRALS:

## 2025-01-06 ENCOUNTER — MATERNAL SCREENING (OUTPATIENT)
Dept: CALL CENTER | Facility: HOSPITAL | Age: 30
End: 2025-01-06
Payer: COMMERCIAL

## 2025-01-06 NOTE — OUTREACH NOTE
Maternal Screening Survey      Flowsheet Row Responses   [facility]   [facility]? Yes   Call start time 162   Call end time 162   I have been able to laugh and see the funny side of things. 0   I have looked forward with enjoyment to things. 1   I have blamed myself unnecessarily when things went wrong. 1   I have been anxious or worried for no good reason. 2   I have felt scared or panicky for no good reason. 0   Things have been getting on top of me. 0   I have been so unhappy that I have had difficulty sleeping. 0   I have felt sad or miserable. 0   I have been so unhappy that I have been crying. 1   The thought of harming myself has occurred to me. 0   [place]  Depression Scale Total 5   Did any of your parents have problems with alcohol or drug use? No   Do any of your peers have problems with alcohol or drug use? No   Does your partner have problems with alcohol or drug use? No   Before you were pregnant did you have problems with alcohol or drug use? (past) No   In the past month, did you drink beer, wine, liquor or use any other drugs? (pregnancy) No   Maternal Screening call completed Yes   Call end time 162              Makenzie BENITES - Registered Nurse

## 2025-01-07 ENCOUNTER — TELEPHONE (OUTPATIENT)
Dept: OBSTETRICS AND GYNECOLOGY | Facility: CLINIC | Age: 30
End: 2025-01-07

## 2025-01-07 NOTE — TELEPHONE ENCOUNTER
"    Caller: Camille Alford \"LEONEL\"    Relationship to patient: Self    Best call back number: [phone]    Chief complaint: 2 WEEK POSTPARTUM- ON OR AROUND 1/6/25    Type of visit: 2 WEEK POSTPARTUM     Requested date: ASAP       "

## 2025-01-09 ENCOUNTER — POSTPARTUM VISIT (OUTPATIENT)
Dept: OBSTETRICS AND GYNECOLOGY | Facility: CLINIC | Age: 30
End: 2025-01-09
Payer: COMMERCIAL

## 2025-01-09 VITALS
BODY MASS INDEX: 22.32 KG/M2 | DIASTOLIC BLOOD PRESSURE: 81 MMHG | SYSTOLIC BLOOD PRESSURE: 126 MMHG | HEIGHT: 67 IN | WEIGHT: 142.2 LBS

## 2025-01-09 NOTE — PROGRESS NOTES
"Here for Postpartum Check     HPI    29 y.o.  - Delivered vaginally, now at 2 weeks postpartum for follow up.   Complications of pregnancy/delivery/postpartum : None  Breastfeeding/Bottlefeeding : Breast-feeding  Baby Blues: Negative  Contraception : None desired  Last pap : 1 year  Complaints: Possible prolapse type symptoms    Review of Systems   Constitutional:  Negative for fatigue and fever.   Genitourinary:  Negative for pelvic pain, vaginal bleeding and vaginal pain.       /81   Ht 170.2 cm (67\")   Wt 64.5 kg (142 lb 3.2 oz)   LMP 2024 (Approximate)   Breastfeeding Yes   BMI 22.27 kg/m²     Physical Exam  Constitutional:       Appearance: She is normal weight.   Abdominal:      General: Abdomen is flat.      Palpations: Abdomen is soft. There is no mass.      Tenderness: There is no abdominal tenderness.   Neurological:      Mental Status: She is alert.   Psychiatric:         Attention and Perception: Attention normal.         Mood and Affect: Mood normal.         Behavior: Behavior normal.   Vitals reviewed.            Assessment/plan   Diagnoses and all orders for this visit:    1. Postpartum follow-up (Primary)    Will see her back in about 5 weeks and do exam and discuss any findings related to prolapse.    Recommendation : 150 min/week of moderate intensity aerobic activity     Song Norwood MD  2025  10:32 EST  "

## 2025-01-10 ENCOUNTER — HOSPITAL ENCOUNTER (OUTPATIENT)
Dept: LACTATION | Facility: HOSPITAL | Age: 30
Discharge: HOME | End: 2025-01-10
Payer: COMMERCIAL

## 2025-01-10 NOTE — LACTATION NOTE
Lactation Consult Note  Came to [facility] for milk supply concern.  Reports had a plugged milk duct last week and took sunflower lecithin, then milk supply decreased. Was pumping 4-5 oz each time, now getting halt the amount.  Has been exclusively pumping since discharged home, then baby latched for the first time last night.  Mom would like assist with latching today as well.      Mom denies any nipple soreness or damage and breasts are full.    Baby does not appear to have a tongue tie but does have white coating on mouth that is most likely milk.      Current feeding regimen: pumps every 2-3 hours and gets 2-3 oz and bottle feeds to baby, then gives 1 oz of formula supplement.  Has a Medela pump and uses morning and night and has an Agueda pump that uses during the day.  Flange size is 24 mm.      BW: 7 lbs 14 oz  Last pediatrician appt: on  weighted 8 lbs 5 oz  Today's pre-feeding weight: 8 lbs 11.9  Post-feeding weight: 8 lbs 12.5 oz  Transferred amt: 0.6 oz  Required oz per day to gain: 22  Required oz per feedin.7 oz  Latched L breast for 15 min; did not take second side.    Educated on positioning and how to obtain a deep latch starting nipple to nose.  After a few attempts, baby latched to left breast in cross cradle hold with deep jaw excursion observed.  Baby was not showing any hunger cues, last feeding was at 0930 and had 3 oz, but latched for 15 min. Needed frequent stimulation to suck, then fell asleep.  Did not latch on right breast.    Mom pumped with Medela pump and was using 24 mm flanges which appeared to large.  Changed to 21 mm and was a better fit and comfortable for Mom.  Pumped 3 oz and fed to baby.    Recommended to continue offering breast when showing early hunger cues.  Continue pumping every 3 hours and giving EBM.  Recommended to power pump two consecutive days to increase supply.  Supplement with formula as needed with a goal of giving more breast milk and less formula.  Educated on  proper nutrition, to increase fluids, eat healthy snacks, [employer] [place] drinks, and lactation cookies.    .    Follow up in [facility] as needed   Evaluation Completed: 1/10/2025 12:33 EST  Patient Name: Camille Alford  :  1995  MRN:  [id number]     REFERRAL  INFORMATION:                          Date of Referral: 01/10/25   Person Making Referral: patient  Maternal Reason for Referral: milk supply concern  Infant Reason for Referral: no latch achieved      MATERNAL ASSESSMENT:  Breast Size Issue: none (01/10/25 1100)  Breast Shape: Bilateral:, round (01/10/25 1100)  Breast Density: Bilateral:, full (01/10/25 1100)  Areola: Bilateral:, elastic (01/10/25 1100)  Nipples: Bilateral:, everted (01/10/25 1100)     Left Nipple Symptoms: intact (01/10/25 1100)  Right Nipple Symptoms: intact (01/10/25 1100)       MATERNAL INFANT FEEDING:  Maternal Preparation: hand hygiene (01/10/25 1100)  Maternal Emotional State: receptive, relaxed (01/10/25 1100)  Infant Positioning: cross-cradle (01/10/25 1100)   Signs of Milk Transfer: breasts soften with feeding, deep jaw excursions noted, transfer present (01/10/25 1100)  Pain with Feeding: no (01/10/25 1100)        Comfort Measures Before/During Feeding: latch adjusted, infant position adjusted (01/10/25 1100)  Milk Ejection Reflex: present (01/10/25 1100)     Nipple Shape After Feeding, Left Breast: symmetrical (01/10/25 1100)     Latch Assistance: verbal guidance offered, minimal assistance (01/10/25 1100)                           Feeding Readiness Cues: quiet (01/10/25 1100)  Satiety Cues: calm after feeding (01/10/25 1100)     Effective Latch During Feeding: yes (01/10/25 1100)  Suck/Swallow/Breathing Coordination: present (01/10/25 1100)     Prefeeding Weight (gm): 3966 g (139.9 oz) (01/10/25 1100)  Postfeeding Weight (gm): 3983 g (140.5 oz) (01/10/25 1100)  Weight Gain/Loss (gm) : 17 g (0.6 oz) (01/10/25 1100)      Latch: 1-->repeated attempts, holds nipple in mouth, stimulate  to suck (01/10/25 1100)  Audible Swallowin-->spontaneous and intermittent (24 hrs old) (01/10/25 1100)  Type of Nipple: 2-->everted (after stimulation) (01/10/25 1100)  Comfort (Breast/Nipple): 2-->soft/nontender (01/10/25 1100)  Hold (Positioning): 1-->minimal assist, teach one side, mother does other, staff holds (01/10/25 1100)  Latch Score: 8 (01/10/25 1100)      EQUIPMENT TYPE:  Breast Pump Type: double electric, personal (01/10/25 1100)  Breast Pump Flange Type: hard (01/10/25 1100)  Breast Pump Flange Size: 21 mm (01/10/25 1100)                        BREAST PUMPING:  Breast Pumping Interventions: post-feed pumping encouraged (01/10/25 1100)  Breast Pumping: double electric breast pump utilized (01/10/25 1100)    LACTATION REFERRALS:

## 2025-01-17 ENCOUNTER — HOSPITAL ENCOUNTER (OUTPATIENT)
Dept: LACTATION | Facility: HOSPITAL | Age: 30
Discharge: HOME | End: 2025-01-17
Payer: COMMERCIAL

## 2025-01-17 NOTE — LACTATION NOTE
Lactation Consult Note  Mom is here today for a second time to see how much milk baby is transferring and to make a decision if she will BF or exclusively pump. She was here a week ago and baby didn't transferred much. She has been triple feeding the last week and is exhausted.  Baby girl Dori was born on 24 and weighed 7lb. 14oz. last week here  baby weighed 8lb.11.9oz. Mom has been pumping every 3-hours after the BF session and is getting around 3oz of EBM each time. Current weight is 9lb.2.3 oz. Baby gained 6.4oz. in 7 days which is WDL. Mom reports baby is getting 2.5-3oz of EBM after each BF session because is crying and it's still hungry.     Breastfeeding:Mother position baby on the the left breast in a cross cradle position and infant started feeding quickly. The suck is deep,but week and non nutritive. After feeding for 12 minutes she released the breast. Infant got only 8ml.  Mom made a decision to start exclusively pumping. She brought her Medela and started pumping. Got 2oz  which were given to baby and she took it all and fell asleep. She also wants to increase slightly her supply.    Plan:   Mom to start exclusively pumping every 3h for 20 min. on each breast and initiate a second let down in 10 min.  Power pumping recommended and she took notes how to do it. Galactagogues discussed.     Birth weight: 7lb.14oz  Current weight:9lb.2.3oz.  Amount transferred: 8ml in 20 min.           Evaluation Completed: 2025 13:03 EST  Patient Name: Camille Alford  :  1995  MRN:  [id number]     REFERRAL  INFORMATION:                          Date of Referral: 25   Person Making Referral: patient  Maternal Reason for Referral: other (see comments) (triple feeding)  Infant Reason for Referral: sleepy, other (see comments) (not transferring well)      MATERNAL ASSESSMENT:  Breast Size Issue: none (25 1110)  Breast Shape: Bilateral:, round (25 1110)  Breast Density: Bilateral:, full  (25 111)  Areola: Bilateral:, elastic (25 111)  Nipples: Bilateral:, everted, graspable (25 111)                MATERNAL INFANT FEEDING:     Maternal Emotional State: independent, receptive, relaxed (25 111)  Infant Positioning: cross-cradle (25)   Signs of Milk Transfer: audible swallow (just few) (25 111)  Pain with Feeding: no (25 111)           Milk Ejection Reflex: present (25)     Nipple Shape After Feeding, Left Breast: round (25 111)     Latch Assistance: none needed (25)                           Feeding Readiness Cues: rooting (25)  Satiety Cues: other (see comments) (still hungry) (25)     Effective Latch During Feeding: no (25)     Skin-to-Skin Contact, Duration: 15 (25)  Prefeeding Weight (gm): 4148 g (146.3 oz) (25 111)  Postfeeding Weight (gm): 4156 g (146.6 oz) (25 111)  Weight Gain/Loss (gm) : 8 g (0.3 oz) (25)      Latch: 1-->repeated attempts, holds nipple in mouth, stimulate to suck (25)  Audible Swallowin-->spontaneous and intermittent (24 hrs old) (25 111)  Type of Nipple: 2-->everted (after stimulation) (25)  Comfort (Breast/Nipple): 2-->soft/nontender (25 111)  Hold (Positioning): 2-->no assist from staff, mother able to position/hold infant (25)  Latch Score: 9 (25)      EQUIPMENT TYPE:  Breast Pump Type: double electric, personal (25 111)  Breast Pump Flange Type: hard (25)  Breast Pump Flange Size: 21 mm (25 1110)                        BREAST PUMPING:  Breast Pumping Interventions: other (see comments) (will excl. pump) (25 111)       LACTATION REFERRALS:

## 2025-01-31 ENCOUNTER — TELEPHONE (OUTPATIENT)
Dept: OBSTETRICS AND GYNECOLOGY | Facility: CLINIC | Age: 30
End: 2025-01-31
Payer: COMMERCIAL

## 2025-01-31 NOTE — TELEPHONE ENCOUNTER
Dr. Norwood pt    Had baby vaginally 12/23/24, pt concerned due to very heavy vaginal bleeding. States she believes this may be first cycle since having baby, not much clotting, is breast feeding so wondering if this is even normal.     Pt did state bleeding is constant, going through more than 1 tampon in hr or less. Advised to go to ED due to heavy bleeding and feeling slightly weak, also informed first cycle sometimes heavy.    Pt is requesting provider's opinion as well and if she should move 2/10 PP appt up    Please advise, thank you

## 2025-02-10 ENCOUNTER — POSTPARTUM VISIT (OUTPATIENT)
Dept: OBSTETRICS AND GYNECOLOGY | Facility: CLINIC | Age: 30
End: 2025-02-10
Payer: COMMERCIAL

## 2025-02-10 VITALS
BODY MASS INDEX: 23.02 KG/M2 | DIASTOLIC BLOOD PRESSURE: 79 MMHG | HEIGHT: 67 IN | WEIGHT: 146.7 LBS | SYSTOLIC BLOOD PRESSURE: 132 MMHG

## 2025-02-10 RX ORDER — ESCITALOPRAM OXALATE 5 MG/1
5 TABLET ORAL DAILY
COMMUNITY

## 2025-02-10 NOTE — PROGRESS NOTES
"Here for Postpartum Check     HPI    29 y.o.  - Delivered vaginally, now at 6 weeks postpartum for follow up.   Complications of pregnancy/delivery/postpartum : None  Breastfeeding/Bottlefeeding : Breast-feeding  Baby Blues: Negative  Contraception : None desired  Last pap : 2023  Complaints: None.  She thinks that some of her prolapse symptoms have actually improved over the last 5 to 6 weeks and wants to follow her symptoms at this time.    Review of Systems   Constitutional:  Negative for fever.   Genitourinary:  Negative for pelvic pain, vaginal bleeding and vaginal pain.       /79   Ht 170.2 cm (67\")   Wt 66.5 kg (146 lb 11.2 oz)   Breastfeeding Yes   BMI 22.98 kg/m²     Physical Exam  Constitutional:       Appearance: She is normal weight.   Abdominal:      General: Abdomen is flat.      Palpations: Abdomen is soft. There is no mass.      Tenderness: There is no abdominal tenderness.   Neurological:      Mental Status: She is alert.   Vitals reviewed.            Assessment/plan   Diagnoses and all orders for this visit:    1. Postpartum follow-up (Primary)    No contraception desired.  She will follow her symptoms and may come back in 6 months to review and assess any degree of prolapse that may or may not be present.    Recommendation : 150 min/week of moderate intensity aerobic activity     Song Norwood MD  2/10/2025  14:12 EST  "

## 2025-02-25 ENCOUNTER — PATIENT MESSAGE (OUTPATIENT)
Dept: OBSTETRICS AND GYNECOLOGY | Facility: CLINIC | Age: 30
End: 2025-02-25
Payer: COMMERCIAL